# Patient Record
Sex: FEMALE
[De-identification: names, ages, dates, MRNs, and addresses within clinical notes are randomized per-mention and may not be internally consistent; named-entity substitution may affect disease eponyms.]

---

## 2020-05-18 ENCOUNTER — APPOINTMENT (OUTPATIENT)
Dept: GASTROENTEROLOGY | Facility: CLINIC | Age: 55
End: 2020-05-18
Payer: MEDICAID

## 2020-05-18 PROBLEM — Z00.00 ENCOUNTER FOR PREVENTIVE HEALTH EXAMINATION: Status: ACTIVE | Noted: 2020-05-18

## 2020-05-21 ENCOUNTER — APPOINTMENT (OUTPATIENT)
Dept: GASTROENTEROLOGY | Facility: CLINIC | Age: 55
End: 2020-05-21
Payer: MEDICAID

## 2020-05-21 VITALS — TEMPERATURE: 98.3 F

## 2020-05-21 VITALS
WEIGHT: 164 LBS | HEIGHT: 61 IN | DIASTOLIC BLOOD PRESSURE: 70 MMHG | HEART RATE: 78 BPM | SYSTOLIC BLOOD PRESSURE: 106 MMHG | BODY MASS INDEX: 30.96 KG/M2 | RESPIRATION RATE: 15 BRPM | OXYGEN SATURATION: 97 %

## 2020-05-21 DIAGNOSIS — Z12.11 ENCOUNTER FOR SCREENING FOR MALIGNANT NEOPLASM OF COLON: ICD-10-CM

## 2020-05-21 DIAGNOSIS — K21.9 GASTRO-ESOPHAGEAL REFLUX DISEASE W/OUT ESOPHAGITIS: ICD-10-CM

## 2020-05-21 PROCEDURE — 82272 OCCULT BLD FECES 1-3 TESTS: CPT

## 2020-05-21 PROCEDURE — 99204 OFFICE O/P NEW MOD 45 MIN: CPT

## 2020-05-21 RX ORDER — OMEPRAZOLE 40 MG/1
40 CAPSULE, DELAYED RELEASE ORAL
Refills: 0 | Status: ACTIVE | COMMUNITY

## 2020-05-21 RX ORDER — OMEPRAZOLE 40 MG/1
40 CAPSULE, DELAYED RELEASE ORAL DAILY
Qty: 90 | Refills: 1 | Status: ACTIVE | COMMUNITY
Start: 2020-05-21 | End: 1900-01-01

## 2020-05-21 RX ORDER — CLOTRIMAZOLE AND BETAMETHASONE DIPROPIONATE 10; .5 MG/G; MG/G
1-0.05 CREAM TOPICAL
Qty: 15 | Refills: 0 | Status: ACTIVE | COMMUNITY
Start: 2020-03-27

## 2020-05-21 NOTE — HISTORY OF PRESENT ILLNESS
[de-identified] : The patient is here for evaluation of GERD and screening colonoscopy.\par    Patient is a history of GERD for the last 4 months. She has heartburn that occurs every day. It is severe and lasts for hours. It is worse after eating and she only limits result of a  at this point. She gets a chest pressure and sometimes regurgitation. Physical globus sensation. As well as a sore throat. No weight loss. She tried Prilosec 20 mg a day with no improvement of symptoms. She tried TUMS which gave her nausea.\par     The patient has no family history of colon cancer or polyps in a first-degree relative. She's never had a colonoscopy. She has no constipation diarrhea black stools or bloody stools

## 2020-05-21 NOTE — ASSESSMENT
[FreeTextEntry1] : A/P \par will do EGD and colon same day\par gerd\par Today's instructions for acid reflux include avoid provocative foods. For example citrus alcohol coffee chocolate mints. Smaller meals, no eating 3 hours prior to bedtime and elevate head of the bed prior to sleep.\par increase to prilosec 40 mg qd\par  I discussed the risks and benefits of EGD and patient was given opportunity to ask questions. EGD to r/o Meier's  esophagus, PUD, mass, AVM'S. Pt is medically optimized for EGD\par \par \par screening colon\par miralax prep\par  I discussed the risks and benefits of colonoscopy and patient was given opportunity to ask questions. Colonoscopy to r/o colon cancer, polyps, AVM's. Patient is medically optimized for the procedure\par cbc, cmp requested from \par  Due to the  current COVID - 19 pandemic , [place] state has recommended that all elective endoscopic procedures be deferred.   Pt will be rescheduled for procedures when the social distancing recommendation is lifted. Pt will be instructed by staff to call if  any worrisome symptoms arise such as rectal bleeding, change in bowel habits occur.\par \par \par

## 2020-05-21 NOTE — PHYSICAL EXAM
[General Appearance - Alert] : alert [General Appearance - In No Acute Distress] : in no acute distress [General Appearance - Well Nourished] : well nourished [Sclera] : the sclera and conjunctiva were normal [Outer Ear] : the ears and nose were normal in appearance [] : no respiratory distress [Neck Appearance] : the appearance of the neck was normal [Respiration, Rhythm And Depth] : normal respiratory rhythm and effort [Exaggerated Use Of Accessory Muscles For Inspiration] : no accessory muscle use [Auscultation Breath Sounds / Voice Sounds] : lungs were clear to auscultation bilaterally [Apical Impulse] : the apical impulse was normal [Heart Rate And Rhythm] : heart rate was normal and rhythm regular [Heart Sounds] : normal S1 and S2 [Abdomen Soft] : soft [Bowel Sounds] : normal bowel sounds [Abdomen Tenderness] : non-tender [Oriented To Time, Place, And Person] : oriented to person, place, and time [Impaired Insight] : insight and judgment were intact [Affect] : the affect was normal [Normal Sphincter Tone] : normal sphincter tone [No Rectal Mass] : no rectal mass [Internal Hemorrhoid] : no internal hemorrhoids [External Hemorrhoid] : no external hemorrhoids [Occult Blood Positive] : stool was negative for occult blood

## 2020-06-03 ENCOUNTER — APPOINTMENT (OUTPATIENT)
Dept: ORTHOPEDIC SURGERY | Facility: CLINIC | Age: 55
End: 2020-06-03
Payer: MEDICAID

## 2020-06-03 VITALS
BODY MASS INDEX: 30.21 KG/M2 | HEIGHT: 61 IN | WEIGHT: 160 LBS | DIASTOLIC BLOOD PRESSURE: 69 MMHG | HEART RATE: 65 BPM | SYSTOLIC BLOOD PRESSURE: 101 MMHG

## 2020-06-03 VITALS — TEMPERATURE: 97.7 F

## 2020-06-03 DIAGNOSIS — Z78.9 OTHER SPECIFIED HEALTH STATUS: ICD-10-CM

## 2020-06-03 PROCEDURE — 99203 OFFICE O/P NEW LOW 30 MIN: CPT

## 2020-06-03 PROCEDURE — 73110 X-RAY EXAM OF WRIST: CPT | Mod: LT

## 2020-06-03 PROCEDURE — 99204 OFFICE O/P NEW MOD 45 MIN: CPT

## 2020-06-03 RX ORDER — CREAM BASE NO.31
CREAM (GRAM) MISCELLANEOUS
Qty: 1 | Refills: 0 | Status: ACTIVE | COMMUNITY
Start: 2020-06-03 | End: 1900-01-01

## 2020-06-03 RX ORDER — METHYLPREDNISOLONE 4 MG/1
4 TABLET ORAL
Qty: 1 | Refills: 0 | Status: ACTIVE | COMMUNITY
Start: 2020-06-03 | End: 1900-01-01

## 2020-06-11 DIAGNOSIS — Z01.818 ENCOUNTER FOR OTHER PREPROCEDURAL EXAMINATION: ICD-10-CM

## 2020-06-15 ENCOUNTER — APPOINTMENT (OUTPATIENT)
Dept: DISASTER EMERGENCY | Facility: CLINIC | Age: 55
End: 2020-06-15

## 2020-06-16 LAB — SARS-COV-2 N GENE NPH QL NAA+PROBE: NOT DETECTED

## 2020-06-18 ENCOUNTER — APPOINTMENT (OUTPATIENT)
Dept: GASTROENTEROLOGY | Facility: GI CENTER | Age: 55
End: 2020-06-18
Payer: MEDICAID

## 2020-06-18 ENCOUNTER — RESULT REVIEW (OUTPATIENT)
Age: 55
End: 2020-06-18

## 2020-06-18 ENCOUNTER — OUTPATIENT (OUTPATIENT)
Dept: OUTPATIENT SERVICES | Facility: HOSPITAL | Age: 55
LOS: 1 days | End: 2020-06-18
Payer: COMMERCIAL

## 2020-06-18 DIAGNOSIS — K21.9 GASTRO-ESOPHAGEAL REFLUX DISEASE WITHOUT ESOPHAGITIS: ICD-10-CM

## 2020-06-18 DIAGNOSIS — Z12.11 ENCOUNTER FOR SCREENING FOR MALIGNANT NEOPLASM OF COLON: ICD-10-CM

## 2020-06-18 PROCEDURE — 88342 IMHCHEM/IMCYTCHM 1ST ANTB: CPT | Mod: 26

## 2020-06-18 PROCEDURE — 88305 TISSUE EXAM BY PATHOLOGIST: CPT

## 2020-06-18 PROCEDURE — 45378 DIAGNOSTIC COLONOSCOPY: CPT

## 2020-06-18 PROCEDURE — 88342 IMHCHEM/IMCYTCHM 1ST ANTB: CPT

## 2020-06-18 PROCEDURE — 43239 EGD BIOPSY SINGLE/MULTIPLE: CPT | Mod: 59

## 2020-06-18 PROCEDURE — 43239 EGD BIOPSY SINGLE/MULTIPLE: CPT

## 2020-06-18 PROCEDURE — G0121: CPT

## 2020-06-18 PROCEDURE — 88305 TISSUE EXAM BY PATHOLOGIST: CPT | Mod: 26

## 2020-06-18 RX ORDER — SUCRALFATE 1 G/1
1 TABLET ORAL 4 TIMES DAILY
Qty: 120 | Refills: 3 | Status: ACTIVE | COMMUNITY
Start: 2020-06-18 | End: 1900-01-01

## 2020-06-18 NOTE — REVIEW OF SYSTEMS
Quality 110: Preventive Care And Screening: Influenza Immunization: Influenza Immunization not Administered because Patient Refused. Quality 130: Documentation Of Current Medications In The Medical Record: Current Medications Documented Quality 474: Zoster Vaccination Status: Shingrix Vaccination not Administered or Previously Received, Reason not Otherwise Specified Detail Level: Detailed [Heartburn] : heartburn [Negative] : Heme/Lymph

## 2020-06-18 NOTE — REASON FOR VISIT
[Follow-Up: _____] : a [unfilled] follow-up visit [FreeTextEntry1] : gerd, screening colon [Endoscopy] : an endoscopy [FreeTextEntry2] : gerd, screening colon

## 2020-06-18 NOTE — ASSESSMENT
[FreeTextEntry1] : A/P\par  Hiatal hernia- continue PPI ( prilosec 40 mg qd)\par hemorrhoids- no polyps\par colonoscopy in 10 years\par F/U in office in 3 months

## 2020-06-18 NOTE — PROCEDURE
[Colon Cancer Screening] : colon cancer screening [Bleeding] : bleeding risk [Infection] : risk of infection [Bowel Prep Kit] : the patient took the appropriate bowel preparation kit as directed [Approved Diet Followed] : the patient avoided solid foods and adhered to the approved diet list for 24 hours prior to the procedure [Prep Qualtiy: ___] : Prep Quality:  [unfilled] [Withdrawal Time: ___] : Withdrawal Time:  [unfilled] [Left Lateral Decubitus] : The patient was positioned in the left lateral decubitus position [Cecum (Landmarks/Transillum)] : and guided to the cecum which was identified by the anatomic landmarks of the appendiceal orifice and ileocecal valve and by transillumination in the right lower quadrant [Insufflated] : insufflated [Retroflex View] : a retroflex view of the rectum was performed [Single Pass Needed] : after a single pass [Hemorrhoids] : hemorrhoids [No Complications] : There were no complications [With Biopsy] : with biopsy [Procedure Explained] : The procedure was explained [GERD] : GERD [Allergies Reviewed] : allergies reviewed. [Benefits] : benefits [Risks] : Risks [Alternatives] : alternatives [Consent Obtained] : written consent was obtained prior to the procedure and is detailed in the patient's record [Patient] : the patient [Cardiac Monitor] : cardiac monitor [Automated Blood Pressure Cuff] : automated blood pressure cuff [Pulse Oximeter] : pulse oximeter [2] : 2 [Sedation Clearance] : the patient was cleared for moderate sedation [Performed By: ___] : Performed by:  SHAHEEN [Hiatal Hernia] : hiatal hernia [Normal] : Normal [Sent to Pathology] : was sent to pathology for analysis [Tolerated Well] : the patient tolerated the procedure well [Vital Signs Stable] : the vital signs were stable [Abnormal Rectum] : a normal rectum [External Hemorrhoids] : no external hemorrhoids [Patient Rotated Into Alternating Positions] : the patient was not rotated [de-identified] : 2 cm hiatal hernia [de-identified] : Random biopsies taken from antrum  and body r/o HP

## 2020-06-18 NOTE — PHYSICAL EXAM
[General Appearance - Alert] : alert [General Appearance - In No Acute Distress] : in no acute distress [General Appearance - Well Nourished] : well nourished [General Appearance - Well Developed] : well developed [Sclera] : the sclera and conjunctiva were normal [Outer Ear] : the ears and nose were normal in appearance [Neck Appearance] : the appearance of the neck was normal [] : no respiratory distress [Exaggerated Use Of Accessory Muscles For Inspiration] : no accessory muscle use [Respiration, Rhythm And Depth] : normal respiratory rhythm and effort [Apical Impulse] : the apical impulse was normal [Auscultation Breath Sounds / Voice Sounds] : lungs were clear to auscultation bilaterally [Heart Sounds] : normal S1 and S2 [Heart Rate And Rhythm] : heart rate was normal and rhythm regular [Bowel Sounds] : normal bowel sounds [Abdomen Tenderness] : non-tender [Abdomen Soft] : soft [Oriented To Time, Place, And Person] : oriented to person, place, and time [Impaired Insight] : insight and judgment were intact [Affect] : the affect was normal

## 2020-06-18 NOTE — PROCEDURE
[Colon Cancer Screening] : colon cancer screening [Bleeding] : bleeding risk [Infection] : risk of infection [Bowel Prep Kit] : the patient took the appropriate bowel preparation kit as directed [Approved Diet Followed] : the patient avoided solid foods and adhered to the approved diet list for 24 hours prior to the procedure [Prep Qualtiy: ___] : Prep Quality:  [unfilled] [Withdrawal Time: ___] : Withdrawal Time:  [unfilled] [Left Lateral Decubitus] : The patient was positioned in the left lateral decubitus position [Cecum (Landmarks/Transillum)] : and guided to the cecum which was identified by the anatomic landmarks of the appendiceal orifice and ileocecal valve and by transillumination in the right lower quadrant [Insufflated] : insufflated [Single Pass Needed] : after a single pass [Retroflex View] : a retroflex view of the rectum was performed [Hemorrhoids] : hemorrhoids [No Complications] : There were no complications [With Biopsy] : with biopsy [GERD] : GERD [Procedure Explained] : The procedure was explained [Allergies Reviewed] : allergies reviewed. [Risks] : Risks [Benefits] : benefits [Alternatives] : alternatives [Consent Obtained] : written consent was obtained prior to the procedure and is detailed in the patient's record [Patient] : the patient [Automated Blood Pressure Cuff] : automated blood pressure cuff [Cardiac Monitor] : cardiac monitor [Pulse Oximeter] : pulse oximeter [2] : 2 [Sedation Clearance] : the patient was cleared for moderate sedation [Performed By: ___] : Performed by:  SHAHEEN [Hiatal Hernia] : hiatal hernia [Normal] : Normal [Sent to Pathology] : was sent to pathology for analysis [Tolerated Well] : the patient tolerated the procedure well [Vital Signs Stable] : the vital signs were stable [Abnormal Rectum] : a normal rectum [External Hemorrhoids] : no external hemorrhoids [Patient Rotated Into Alternating Positions] : the patient was not rotated [de-identified] : 2 cm hiatal hernia [de-identified] : Random biopsies taken from antrum  and body r/o HP

## 2020-06-22 LAB — SURGICAL PATHOLOGY STUDY: SIGNIFICANT CHANGE UP

## 2020-07-01 ENCOUNTER — APPOINTMENT (OUTPATIENT)
Dept: PHYSICAL MEDICINE AND REHAB | Facility: CLINIC | Age: 55
End: 2020-07-01
Payer: MEDICAID

## 2020-07-01 VITALS
DIASTOLIC BLOOD PRESSURE: 68 MMHG | SYSTOLIC BLOOD PRESSURE: 105 MMHG | TEMPERATURE: 97.5 F | HEIGHT: 61 IN | WEIGHT: 164 LBS | HEART RATE: 73 BPM | BODY MASS INDEX: 30.96 KG/M2

## 2020-07-01 PROCEDURE — 76882 US LMTD JT/FCL EVL NVASC XTR: CPT | Mod: LT

## 2020-07-01 PROCEDURE — 95908 NRV CNDJ TST 3-4 STUDIES: CPT | Mod: LT

## 2020-07-14 ENCOUNTER — APPOINTMENT (OUTPATIENT)
Dept: ORTHOPEDIC SURGERY | Facility: CLINIC | Age: 55
End: 2020-07-14
Payer: MEDICAID

## 2020-07-14 VITALS
DIASTOLIC BLOOD PRESSURE: 66 MMHG | HEIGHT: 61 IN | BODY MASS INDEX: 30.96 KG/M2 | WEIGHT: 164 LBS | HEART RATE: 71 BPM | SYSTOLIC BLOOD PRESSURE: 111 MMHG

## 2020-07-14 DIAGNOSIS — R20.2 PARESTHESIA OF SKIN: ICD-10-CM

## 2020-07-14 PROCEDURE — 99214 OFFICE O/P EST MOD 30 MIN: CPT

## 2020-07-14 RX ORDER — METHYLPREDNISOLONE 4 MG/1
4 TABLET ORAL
Qty: 1 | Refills: 0 | Status: ACTIVE | COMMUNITY
Start: 2020-07-14 | End: 1900-01-01

## 2020-12-23 PROBLEM — Z12.11 ENCOUNTER FOR SCREENING COLONOSCOPY: Status: RESOLVED | Noted: 2020-05-21 | Resolved: 2020-12-23

## 2021-04-23 ENCOUNTER — TRANSCRIPTION ENCOUNTER (OUTPATIENT)
Age: 56
End: 2021-04-23

## 2021-08-08 ENCOUNTER — NON-APPOINTMENT (OUTPATIENT)
Age: 56
End: 2021-08-08

## 2021-08-09 ENCOUNTER — APPOINTMENT (OUTPATIENT)
Dept: ORTHOPEDIC SURGERY | Facility: CLINIC | Age: 56
End: 2021-08-09
Payer: MEDICAID

## 2021-08-09 VITALS
DIASTOLIC BLOOD PRESSURE: 75 MMHG | HEART RATE: 76 BPM | BODY MASS INDEX: 30.96 KG/M2 | WEIGHT: 164 LBS | SYSTOLIC BLOOD PRESSURE: 108 MMHG | HEIGHT: 61 IN

## 2021-08-09 DIAGNOSIS — M18.11 UNILATERAL PRIMARY OSTEOARTHRITIS OF FIRST CARPOMETACARPAL JOINT, RIGHT HAND: ICD-10-CM

## 2021-08-09 PROCEDURE — 20600 DRAIN/INJ JOINT/BURSA W/O US: CPT | Mod: FA

## 2021-08-09 PROCEDURE — 73130 X-RAY EXAM OF HAND: CPT | Mod: LT

## 2021-08-09 PROCEDURE — 99214 OFFICE O/P EST MOD 30 MIN: CPT | Mod: 25

## 2021-08-09 RX ORDER — METHYLPREDNISOLONE ACETATE 40 MG/ML
40 INJECTION, SUSPENSION INTRA-ARTICULAR; INTRALESIONAL; INTRAMUSCULAR; SOFT TISSUE
Qty: 0.5 | Refills: 0 | Status: ACTIVE | COMMUNITY
Start: 2021-08-09

## 2022-06-21 ENCOUNTER — APPOINTMENT (OUTPATIENT)
Dept: ORTHOPEDIC SURGERY | Facility: CLINIC | Age: 57
End: 2022-06-21
Payer: MEDICAID

## 2022-06-21 VITALS
HEIGHT: 61 IN | HEART RATE: 75 BPM | SYSTOLIC BLOOD PRESSURE: 106 MMHG | WEIGHT: 164 LBS | DIASTOLIC BLOOD PRESSURE: 71 MMHG | BODY MASS INDEX: 30.96 KG/M2

## 2022-06-21 DIAGNOSIS — M18.12 UNILATERAL PRIMARY OSTEOARTHRITIS OF FIRST CARPOMETACARPAL JOINT, LEFT HAND: ICD-10-CM

## 2022-06-21 PROCEDURE — 20600 DRAIN/INJ JOINT/BURSA W/O US: CPT | Mod: FA

## 2022-06-21 PROCEDURE — 99214 OFFICE O/P EST MOD 30 MIN: CPT | Mod: 25

## 2022-06-21 RX ORDER — METHYLPREDNISOLONE ACETATE 40 MG/ML
40 INJECTION, SUSPENSION INTRA-ARTICULAR; INTRALESIONAL; INTRAMUSCULAR; SOFT TISSUE
Qty: 0.5 | Refills: 0 | Status: ACTIVE | COMMUNITY
Start: 2022-06-21

## 2022-11-01 ENCOUNTER — OFFICE VISIT (OUTPATIENT)
Dept: FAMILY MEDICINE CLINIC | Facility: CLINIC | Age: 57
End: 2022-11-01

## 2022-11-01 VITALS
SYSTOLIC BLOOD PRESSURE: 112 MMHG | HEIGHT: 61 IN | BODY MASS INDEX: 27.75 KG/M2 | DIASTOLIC BLOOD PRESSURE: 76 MMHG | TEMPERATURE: 97.8 F | WEIGHT: 147 LBS | OXYGEN SATURATION: 98 % | HEART RATE: 76 BPM

## 2022-11-01 DIAGNOSIS — Z00.00 HEALTHCARE MAINTENANCE: ICD-10-CM

## 2022-11-01 DIAGNOSIS — Z12.31 SCREENING MAMMOGRAM, ENCOUNTER FOR: ICD-10-CM

## 2022-11-01 DIAGNOSIS — Z00.00 ANNUAL PHYSICAL EXAM: Primary | ICD-10-CM

## 2022-11-01 DIAGNOSIS — Z23 NEED FOR TUBERCULOSIS VACCINATION: ICD-10-CM

## 2022-11-01 DIAGNOSIS — N28.9 KIDNEY DISEASE: ICD-10-CM

## 2022-11-01 NOTE — PROGRESS NOTES
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  [address]    NAME: Laurie Núñez  AGE: 64 y o  SEX: female  : 1965     DATE: 2022     Assessment and Plan:     Problem List Items Addressed This Visit        Genitourinary    Kidney disease     Will scan records into chart  Bifurcated kidney  Referred to nephrology to follow  Relevant Orders    Ambulatory Referral to Nephrology      Other Visit Diagnoses     Annual physical exam    -  Primary    Screening mammogram, encounter for        Relevant Orders    Mammo screening bilateral w 3d & cad    BMI 27 0-27 9,adult        Healthcare maintenance        Relevant Orders    CBC and differential    Comprehensive metabolic panel    Lipid panel    TSH, 3rd generation with Free T4 reflex    Need for tuberculosis vaccination        Relevant Orders    TB Skin Test (Completed)          Immunizations and preventive care screenings were discussed with patient today  Appropriate education was printed on patient's after visit summary  Counseling:  · Exercise: the importance of regular exercise/physical activity was discussed  Recommend exercise 3-5 times per week for at least 30 minutes  BMI Counseling: Body mass index is 27 78 kg/m²  The BMI is above normal  Nutrition recommendations include decreasing portion sizes and encouraging healthy choices of fruits and vegetables  Exercise recommendations include moderate physical activity 150 minutes/week and exercising 3-5 times per week   Rationale for BMI follow-up plan is due to patient being overweight or obese  Depression Screening and Follow-up Plan: Patient was screened for depression during today's encounter  They screened negative with a PHQ-2 score of 0  Return in about 6 months (around 5/1/2023)  Chief Complaint:     Chief Complaint   Patient presents with   • Establish Care      History of Present Illness:     Adult Annual Physical   Patient here for a comprehensive physical exam  The patient reports no problems  Diet and Physical Activity  · Diet/Nutrition: well balanced diet  · Exercise: no formal exercise  Depression Screening  PHQ-2/9 Depression Screening    Little interest or pleasure in doing things: 0 - not at all  Feeling down, depressed, or hopeless: 0 - not at all  PHQ-2 Score: 0  PHQ-2 Interpretation: Negative depression screen       General Health  · Sleep: sleeps well  · Hearing: hearing aid right ear   · Vision: goes for regular eye exams and wears glasses  · Dental: regular dental visits  /GYN Health  · Patient is: postmenopausal  · Last menstrual period:   · Contraceptive method:       Review of Systems:     Review of Systems   Constitutional: Negative for chills and fever  HENT: Negative for ear pain and sore throat  Eyes: Negative for pain and visual disturbance  Respiratory: Negative for cough and shortness of breath  Cardiovascular: Negative for chest pain and palpitations  Gastrointestinal: Negative for abdominal pain and vomiting  Genitourinary: Negative for dysuria and hematuria  Musculoskeletal: Negative for arthralgias and back pain  Skin: Negative for color change and rash  Neurological: Negative for seizures and syncope  Psychiatric/Behavioral: Negative for dysphoric mood and sleep disturbance  The patient is not nervous/anxious  All other systems reviewed and are negative  Past Medical History:     History reviewed  No pertinent past medical history     Past Surgical History:     Past Surgical History: Procedure Laterality Date   • PARTIAL HYSTERECTOMY     • THYROIDECTOMY        Social History:     Social History     Socioeconomic History   • Marital status:      Spouse name: None   • Number of children: None   • Years of education: None   • Highest education level: None   Occupational History   • None   Tobacco Use   • Smoking status: Never Smoker   • Smokeless tobacco: Never Used   Substance and Sexual Activity   • Alcohol use: Not Currently   • Drug use: Never   • Sexual activity: None   Other Topics Concern   • None   Social History Narrative   • None     Social Determinants of Health     Financial Resource Strain: Not on file   Food Insecurity: Not on file   Transportation Needs: Not on file   Physical Activity: Not on file   Stress: Not on file   Social Connections: Not on file   Intimate Partner Violence: Not on file   Housing Stability: Not on file      Family History:     Family History   Problem Relation Age of Onset   • No Known Problems Mother    • Liver cancer Father       Current Medications:     No current outpatient medications on file  No current facility-administered medications for this visit  Allergies: Allergies   Allergen Reactions   • Sulfur Rash      Physical Exam:     /76   Pulse 76   Temp 97 8 °F (36 6 °C)   Ht 5' 1" (1 549 m)   Wt 66 7 kg (147 lb)   SpO2 98%   BMI 27 78 kg/m²     Physical Exam  Vitals and nursing note reviewed  Constitutional:       General: She is not in acute distress  Appearance: She is well-developed  HENT:      Head: Normocephalic and atraumatic  Right Ear: Tympanic membrane normal       Left Ear: Tympanic membrane normal    Eyes:      Conjunctiva/sclera: Conjunctivae normal    Cardiovascular:      Rate and Rhythm: Normal rate and regular rhythm  Heart sounds: No murmur heard  Pulmonary:      Effort: Pulmonary effort is normal  No respiratory distress  Breath sounds: Normal breath sounds     Abdominal: Palpations: Abdomen is soft  Tenderness: There is no abdominal tenderness  Musculoskeletal:      Cervical back: Neck supple  Skin:     General: Skin is warm and dry  Neurological:      Mental Status: She is alert and oriented to person, place, and time     Psychiatric:         Mood and Affect: Mood normal           Deborah Castro, [address] [facility]

## 2022-11-01 NOTE — PATIENT INSTRUCTIONS

## 2022-11-03 ENCOUNTER — CLINICAL SUPPORT (OUTPATIENT)
Dept: FAMILY MEDICINE CLINIC | Facility: CLINIC | Age: 57
End: 2022-11-03

## 2022-11-03 DIAGNOSIS — Z11.1 ENCOUNTER FOR PPD SKIN TEST READING: Primary | ICD-10-CM

## 2022-11-03 LAB
INDURATION: NORMAL MM
TB SKIN TEST: NEGATIVE

## 2022-11-09 ENCOUNTER — TELEPHONE (OUTPATIENT)
Dept: NEPHROLOGY | Facility: CLINIC | Age: 57
End: 2022-11-09

## 2022-11-09 NOTE — TELEPHONE ENCOUNTER
I called and left messag on patients answering machine for patient to return our call back about scheduling a Nephrology Consult Ref by Dr Luis Newton for Kidney disease

## 2022-11-18 ENCOUNTER — TELEPHONE (OUTPATIENT)
Dept: NEPHROLOGY | Facility: CLINIC | Age: 57
End: 2022-11-18

## 2022-11-18 NOTE — TELEPHONE ENCOUNTER
I called and left message on patients answering machine for patient to return our call back about scheduling a Nephrology Consult Ref by Dr Sia Smith for Kidney disease  Unable to contact patient 3x  Letter sent to patients address   Referral closed

## 2022-12-13 ENCOUNTER — TELEPHONE (OUTPATIENT)
Dept: FAMILY MEDICINE CLINIC | Facility: CLINIC | Age: 57
End: 2022-12-13

## 2022-12-13 NOTE — TELEPHONE ENCOUNTER
Opal Pastrana called and said last time she saw Jamar Chan that she said if she ever needed a name or referral for bereavement whether its a group or person to call, well she would like a name for either she does state that she does not have insurance and only works part time job

## 2023-01-17 ENCOUNTER — TELEPHONE (OUTPATIENT)
Dept: FAMILY MEDICINE CLINIC | Facility: CLINIC | Age: 58
End: 2023-01-17

## 2023-01-17 NOTE — TELEPHONE ENCOUNTER
Pt requested information on whether [facility] offers discounts for widows that are self pay due to losing insurance from spouse who passed away  As per Bill Chaudhary, she can call [facility] and ask to speak with a  and explain the situation to them so they can assist      Left voicemail for patient to give her this information

## 2023-03-09 DIAGNOSIS — Z12.31 SCREENING MAMMOGRAM, ENCOUNTER FOR: ICD-10-CM

## 2023-05-01 ENCOUNTER — OFFICE VISIT (OUTPATIENT)
Dept: FAMILY MEDICINE CLINIC | Facility: CLINIC | Age: 58
End: 2023-05-01

## 2023-05-01 VITALS
WEIGHT: 153.2 LBS | OXYGEN SATURATION: 97 % | BODY MASS INDEX: 28.92 KG/M2 | TEMPERATURE: 97 F | DIASTOLIC BLOOD PRESSURE: 80 MMHG | SYSTOLIC BLOOD PRESSURE: 98 MMHG | HEIGHT: 61 IN | HEART RATE: 77 BPM

## 2023-05-01 DIAGNOSIS — N28.9 KIDNEY DISEASE: ICD-10-CM

## 2023-05-01 DIAGNOSIS — Z00.00 HEALTHCARE MAINTENANCE: Primary | ICD-10-CM

## 2023-05-01 NOTE — PROGRESS NOTES
"   Assessment/Plan:     Chronic Problems:  Kidney disease  Reviewed labs with patient, all wnl  Will hold off on Nephrology consult due to no insurance  Visit Diagnosis:  Diagnoses and all orders for this visit:    Healthcare maintenance  -     CBC and differential; Future  -     Comprehensive metabolic panel; Future  -     Lipid panel; Future  -     TSH, 3rd generation with Free T4 reflex; Future    Kidney disease          Subjective:    Patient ID: Robi Conteh is a 62 y o  female  Patient presents for routine follow up  She did have blood work done at [facility], all wnl  She is feeling well and has no concerns for today  The following portions of the patient's history were reviewed and updated as appropriate: allergies, current medications, past family history, past medical history, past social history, past surgical history and problem list     Review of Systems   Constitutional: Negative for chills and fever  HENT: Negative for ear pain and sore throat  Eyes: Negative for pain and visual disturbance  Respiratory: Negative for cough and shortness of breath  Cardiovascular: Negative for chest pain and palpitations  Gastrointestinal: Negative for abdominal pain and vomiting  Genitourinary: Negative for dysuria and hematuria  Musculoskeletal: Negative for arthralgias and back pain  Skin: Negative for color change and rash  Neurological: Negative for dizziness, seizures, syncope, light-headedness and headaches  Psychiatric/Behavioral: Positive for dysphoric mood and sleep disturbance  The patient is nervous/anxious  All other systems reviewed and are negative  BP 98/80 (BP Location: Left arm, Patient Position: Sitting, Cuff Size: Standard)   Pulse 77   Temp (!) 97 °F (36 1 °C)   Ht 5' 1\" (1 549 m)   Wt 69 5 kg (153 lb 3 2 oz)   SpO2 97%   BMI 28 95 kg/m²   Social History     Socioeconomic History    Marital status:       Spouse name: Not on file    Number of " children: Not on file    Years of education: Not on file    Highest education level: Not on file   Occupational History    Not on file   Tobacco Use    Smoking status: Never    Smokeless tobacco: Never   Substance and Sexual Activity    Alcohol use: Not Currently    Drug use: Never    Sexual activity: Not on file   Other Topics Concern    Not on file   Social History Narrative    Not on file     Social Determinants of Health     Financial Resource Strain: Not on file   Food Insecurity: Not on file   Transportation Needs: Not on file   Physical Activity: Not on file   Stress: Not on file   Social Connections: Not on file   Intimate Partner Violence: Not on file   Housing Stability: Not on file     History reviewed  No pertinent past medical history  Family History   Problem Relation Age of Onset    No Known Problems Mother     Liver cancer Father      Past Surgical History:   Procedure Laterality Date    PARTIAL HYSTERECTOMY      THYROIDECTOMY       No current outpatient medications on file  Allergies   Allergen Reactions    Sulfur Rash          Lab Review   No visits with results within 2 Month(s) from this visit  Latest known visit with results is:   Office Visit on 11/01/2022   Component Date Value    TB Skin Test 11/03/2022 Negative     Induration 11/03/2022 0n         Imaging: No results found  Objective:     Physical Exam  Constitutional:       Appearance: She is well-developed  Cardiovascular:      Rate and Rhythm: Normal rate and regular rhythm  Heart sounds: Normal heart sounds  No murmur heard  Pulmonary:      Effort: Pulmonary effort is normal  No respiratory distress  Breath sounds: Normal breath sounds  Skin:     General: Skin is warm and dry  Neurological:      Mental Status: She is alert and oriented to person, place, and time  There are no Patient Instructions on file for this visit      VIKTROIA Willis    Portions of the record may have been "created with voice recognition software  Occasional wrong word or \"sound a like\" substitutions may have occurred due to the inherent limitations of voice recognition software  Read the chart carefully and recognize, using context, where substitutions have occurred    "

## 2023-12-10 ENCOUNTER — TELEPHONE (OUTPATIENT)
Dept: OTHER | Facility: OTHER | Age: 58
End: 2023-12-10

## 2023-12-11 ENCOUNTER — TELEPHONE (OUTPATIENT)
Dept: FAMILY MEDICINE CLINIC | Facility: CLINIC | Age: 58
End: 2023-12-11

## 2023-12-11 NOTE — TELEPHONE ENCOUNTER
Pt called regarding a follow up appt from the hospital that needed to be scheduled. Pt would prefer to be called before 8:15AM or after 4PM to schedule appt.

## 2023-12-11 NOTE — TELEPHONE ENCOUNTER
Spoke with patient- she stated that will follow up with her neurologist and cardiologist and get back in touch with us afterwards to follow up with David He unless a 7am slot opens up for her to take. Thank you!

## 2024-01-04 ENCOUNTER — OFFICE VISIT (OUTPATIENT)
Dept: FAMILY MEDICINE CLINIC | Facility: CLINIC | Age: 59
End: 2024-01-04
Payer: COMMERCIAL

## 2024-01-04 VITALS
OXYGEN SATURATION: 97 % | HEIGHT: 61 IN | HEART RATE: 98 BPM | SYSTOLIC BLOOD PRESSURE: 90 MMHG | WEIGHT: 154.4 LBS | BODY MASS INDEX: 29.15 KG/M2 | DIASTOLIC BLOOD PRESSURE: 60 MMHG

## 2024-01-04 DIAGNOSIS — R42 DIZZINESS: ICD-10-CM

## 2024-01-04 DIAGNOSIS — Z12.31 ENCOUNTER FOR SCREENING MAMMOGRAM FOR MALIGNANT NEOPLASM OF BREAST: ICD-10-CM

## 2024-01-04 DIAGNOSIS — S06.0X1D CONCUSSION WITH LOSS OF CONSCIOUSNESS OF 30 MINUTES OR LESS, SUBSEQUENT ENCOUNTER: Primary | ICD-10-CM

## 2024-01-04 PROCEDURE — 99214 OFFICE O/P EST MOD 30 MIN: CPT | Performed by: NURSE PRACTITIONER

## 2024-01-04 RX ORDER — NAPROXEN 500 MG/1
1 TABLET ORAL 2 TIMES DAILY
COMMUNITY
Start: 2023-12-08

## 2024-01-04 RX ORDER — CYCLOBENZAPRINE HCL 10 MG
TABLET ORAL
COMMUNITY
Start: 2023-12-07

## 2024-01-04 RX ORDER — IBUPROFEN 800 MG/1
TABLET ORAL
COMMUNITY
Start: 2023-11-20
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
## 2024-03-06 DIAGNOSIS — Z12.31 ENCOUNTER FOR SCREENING MAMMOGRAM FOR MALIGNANT NEOPLASM OF BREAST: ICD-10-CM

## 2024-03-11 ENCOUNTER — TELEPHONE (OUTPATIENT)
Dept: FAMILY MEDICINE CLINIC | Facility: CLINIC | Age: 59
End: 2024-03-11

## 2024-03-11 DIAGNOSIS — N28.9 KIDNEY DISEASE: Primary | ICD-10-CM

## 2024-03-11 NOTE — TELEPHONE ENCOUNTER
She had surgery for kidney stones almost 2 years ago as well as thyroid surgery. She would like to establish with both specialties. Also orthopedics for arthritis. Please adivse.

## 2024-03-13 ENCOUNTER — TELEPHONE (OUTPATIENT)
Dept: NEPHROLOGY | Facility: CLINIC | Age: 59
End: 2024-03-13

## 2024-03-13 NOTE — TELEPHONE ENCOUNTER
I called and left a message on machine for patient to return our call about scheduling a Nephrology Consult appointment ref by Ale Castro for Kidney disease.

## 2024-03-14 ENCOUNTER — TELEPHONE (OUTPATIENT)
Dept: NEPHROLOGY | Facility: CLINIC | Age: 59
End: 2024-03-14

## 2024-03-14 NOTE — TELEPHONE ENCOUNTER
I called and left a message on machine for patient to return our call about scheduling a Nephrology Consult appointment ref by VIKTORIA Willis for Kidney disease. I also explained that I was mailing out a letter to the patient just as a reminder to recall our call so we can schedule her Nephrology Consult appointment.

## 2024-04-03 ENCOUNTER — TELEPHONE (OUTPATIENT)
Dept: NEPHROLOGY | Facility: CLINIC | Age: 59
End: 2024-04-03

## 2024-04-03 ENCOUNTER — TELEPHONE (OUTPATIENT)
Age: 59
End: 2024-04-03

## 2024-04-03 DIAGNOSIS — N18.1 STAGE 1 CHRONIC KIDNEY DISEASE: Primary | ICD-10-CM

## 2024-04-03 NOTE — TELEPHONE ENCOUNTER
Patient returning call regarding labs. Patient wanted to clarify she needed labs drawn today before appt tomorrow.

## 2024-04-03 NOTE — TELEPHONE ENCOUNTER
Patient called and was at a lab to get labs drawn. They informed the patient they didn't see her lab orders. I apologized to the patient and found a [facility] lab near her that was 6 minutes away. I gave her the address, and told her if she needs anything else to call back. No further questions at this time.
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
## 2024-04-04 ENCOUNTER — CONSULT (OUTPATIENT)
Dept: NEPHROLOGY | Facility: CLINIC | Age: 59
End: 2024-04-04

## 2024-04-04 ENCOUNTER — APPOINTMENT (OUTPATIENT)
Age: 59
End: 2024-04-04
Payer: COMMERCIAL

## 2024-04-04 ENCOUNTER — DOCUMENTATION (OUTPATIENT)
Dept: NEPHROLOGY | Facility: CLINIC | Age: 59
End: 2024-04-04

## 2024-04-04 VITALS
HEART RATE: 68 BPM | WEIGHT: 160 LBS | RESPIRATION RATE: 16 BRPM | DIASTOLIC BLOOD PRESSURE: 70 MMHG | OXYGEN SATURATION: 82 % | TEMPERATURE: 96.9 F | BODY MASS INDEX: 30.21 KG/M2 | SYSTOLIC BLOOD PRESSURE: 104 MMHG | HEIGHT: 61 IN

## 2024-04-04 DIAGNOSIS — R30.0 DYSURIA: Primary | ICD-10-CM

## 2024-04-04 DIAGNOSIS — N18.1 STAGE 1 CHRONIC KIDNEY DISEASE: ICD-10-CM

## 2024-04-04 DIAGNOSIS — N20.0 NEPHROLITHIASIS: Primary | ICD-10-CM

## 2024-04-04 LAB
ANION GAP SERPL CALCULATED.3IONS-SCNC: 7 MMOL/L (ref 4–13)
BACTERIA UR QL AUTO: ABNORMAL /HPF
BILIRUB UR QL STRIP: NEGATIVE
BUN SERPL-MCNC: 15 MG/DL (ref 5–25)
CALCIUM SERPL-MCNC: 9.1 MG/DL (ref 8.4–10.2)
CAOX CRY URNS QL MICRO: ABNORMAL /HPF
CHLORIDE SERPL-SCNC: 108 MMOL/L (ref 96–108)
CLARITY UR: CLEAR
CO2 SERPL-SCNC: 28 MMOL/L (ref 21–32)
COLOR UR: ABNORMAL
CREAT SERPL-MCNC: 0.73 MG/DL (ref 0.6–1.3)
CREAT UR-MCNC: 114.2 MG/DL
GFR SERPL CREATININE-BSD FRML MDRD: 91 ML/MIN/1.73SQ M
GLUCOSE P FAST SERPL-MCNC: 96 MG/DL (ref 65–99)
GLUCOSE UR STRIP-MCNC: NEGATIVE MG/DL
HGB UR QL STRIP.AUTO: ABNORMAL
KETONES UR STRIP-MCNC: NEGATIVE MG/DL
LEUKOCYTE ESTERASE UR QL STRIP: ABNORMAL
MICROALBUMIN UR-MCNC: 10.8 MG/L
MICROALBUMIN/CREAT 24H UR: 9 MG/G CREATININE (ref 0–30)
NITRITE UR QL STRIP: NEGATIVE
NON-SQ EPI CELLS URNS QL MICRO: ABNORMAL /HPF
PH UR STRIP.AUTO: 6.5 [PH]
POTASSIUM SERPL-SCNC: 4.5 MMOL/L (ref 3.5–5.3)
PROT UR STRIP-MCNC: ABNORMAL MG/DL
RBC #/AREA URNS AUTO: ABNORMAL /HPF
SODIUM SERPL-SCNC: 143 MMOL/L (ref 135–147)
SP GR UR STRIP.AUTO: 1.02 (ref 1–1.03)
UROBILINOGEN UR STRIP-ACNC: <2 MG/DL
WBC #/AREA URNS AUTO: ABNORMAL /HPF

## 2024-04-04 PROCEDURE — 36415 COLL VENOUS BLD VENIPUNCTURE: CPT

## 2024-04-04 PROCEDURE — 82043 UR ALBUMIN QUANTITATIVE: CPT

## 2024-04-04 PROCEDURE — 82570 ASSAY OF URINE CREATININE: CPT

## 2024-04-04 PROCEDURE — 80048 BASIC METABOLIC PNL TOTAL CA: CPT

## 2024-04-04 PROCEDURE — 81001 URINALYSIS AUTO W/SCOPE: CPT

## 2024-04-04 RX ORDER — CIPROFLOXACIN 500 MG/1
500 TABLET, FILM COATED ORAL EVERY 12 HOURS SCHEDULED
Qty: 10 TABLET | Refills: 0 | Status: SHIPPED | OUTPATIENT
Start: 2024-04-04 | End: 2024-04-09

## 2024-04-04 NOTE — PROGRESS NOTES
Labs [done on 4/4/2024]-called and left message for the patient.  Urine analysis showed dysuria and plan to start patient on Cipro 500 mg p.o. twice daily x 5 days.  Prescription was sent to local pharmacy.    Velasquez Claudio MD  Nephrology  4/4/2024

## 2024-04-04 NOTE — PROGRESS NOTES
NEPHROLOGY OFFICE CONSULT  Adrienne Mendes 58 y.o.female YOB: 1965 MRN: [id number]    Encounter: [id number] DATE: 4/4/2024    REASON FOR VISIT: Adrienne Mendes is a 58 y.o.female who was referred by  for further management of metabolic stone workup for further management of nephrolithiasis    HPI:    This is 58-year-old female with past medical history significant for thyroidectomy, referred for metabolic stone workup for further management of nephrolithiasis    According to patient in 2022 she was admitted to hospital in [place] where she was found to have right hydronephrosis and had underwent lithotripsy.  Patient had KUB done on 4/7/2022 and also renal ultrasound on 4/7/2022 showed bilateral nonobstructive renal calculi.  According to patient for last 2 years she was not evaluated by anybody and looking forward for metabolic stone workup at this point.  According to patient she has been having trouble with kidney stone for many years and had underwent lithotripsy is also in the past.    For last 2 months, patient has been noticing to have strong odor in the urine.  At this point patient is not taking any medications at home on regular basis.        REVIEW OF SYSTEMS:    Review of Systems   Constitutional:  Negative for chills and fever.   HENT:  Negative for nosebleeds.    Eyes:  Negative for photophobia and pain.   Respiratory:  Negative for choking.    Cardiovascular:  Negative for palpitations.   Gastrointestinal:  Negative for blood in stool.   Genitourinary:  Negative for hematuria.   Musculoskeletal:  Negative for neck stiffness.   Neurological:  Negative for seizures.   Psychiatric/Behavioral:  Negative for confusion and suicidal ideas.          PAST MEDICAL HISTORY:  History reviewed. No pertinent past medical history.    PAST SURGICAL HISTORY:  Past Surgical History:   Procedure Laterality Date    PARTIAL HYSTERECTOMY      THYROIDECTOMY         SOCIAL HISTORY:  Social History  "    Substance and Sexual Activity   Alcohol Use Not Currently     Social History     Substance and Sexual Activity   Drug Use Never     Social History     Tobacco Use   Smoking Status Never   Smokeless Tobacco Never       FAMILY HISTORY:  Family History   Problem Relation Age of Onset    No Known Problems Mother     Liver cancer Father        ALLERGY:  Allergies   Allergen Reactions    Sulfur Rash       MEDICATIONS:  No current outpatient medications on file.    PHYSICAL EXAM:  Vitals:    04/04/24 1321   BP: 104/70   Pulse: 68   Resp: 16   Temp: (!) 96.9 °F (36.1 °C)   TempSrc: Temporal   SpO2: (!) 82%   Weight: 72.6 kg (160 lb)   Height: 5' 1\" (1.549 m)     Body mass index is 30.23 kg/m².    Physical Exam  Constitutional:       General: She is not in acute distress.  HENT:      Right Ear: External ear normal.   Eyes:      General: No scleral icterus.     Conjunctiva/sclera:      Right eye: No hemorrhage.  Neck:      Thyroid: No thyromegaly.      Vascular: No JVD.   Cardiovascular:      Rate and Rhythm: Normal rate.   Pulmonary:      Effort: Pulmonary effort is normal. No accessory muscle usage or respiratory distress.      Breath sounds: No wheezing.   Abdominal:      General: There is no distension.   Musculoskeletal:      Right ankle: No swelling.      Left ankle: No swelling.   Skin:     General: Skin is warm.      Coloration: Skin is not jaundiced.   Neurological:      Mental Status: She is alert. She is not disoriented.   Psychiatric:         Speech: She is communicative.         Behavior: Behavior is not combative.         LAB RESULTS:  Results for orders placed or performed in visit on 11/01/22   TB Skin Test   Result Value Ref Range    TB Skin Test Negative     Induration 0n mm       ASSESSMENT and PLAN:  Adrienne was seen today for consult and chronic kidney disease.    Diagnoses and all orders for this visit:    Nephrolithiasis  -     Ambulatory Referral to Nephrology  -     Basic metabolic panel; " Future  -     Phosphorus; Future  -     Uric acid; Future  -     PTH, intact; Future  -     US kidney and bladder with pvr; Future      1.  Nephrolithiasis.  Exact etiology is currently unclear.    Patient has a history of kidney stone for many years and on 2/22/2022, patient had underwent CT scan of abdomen pelvis and found to have mild right hydronephrosis due to obstructive stone and had underwent lithotripsy.  KUB and renal ultrasound done on 4/7/2022 which I have reviewed results from [facility] showed bilateral nonobstructive renal calculi.  Patient is interested doing metabolic stone workup at this point.    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  Discussed with patient today that if found to have obstructive renal calculi, recommend referring to urology for possible lithotripsy in the future.    Advised patient to drink around 3 L of fluid on daily basis to maintain daily urine output for more than 2 L/day.  Also encourage patient to add lemon to the water which would be beneficial to help boost citrate level as a part of metabolic stone workup strategy.    Plan to check BMP, uric acid, PTH, phosphorus along with 24-hour urine collection as a part of metabolic stone workup    Discussed with patient that we will wait for those results to come back prior to discussing further regarding dietary modification versus medication use.    Patient is also been noticing strong odor in the urine for last 2 months and currently does not take any medication at home.  Discussed with patient that if found to have dysuria on urine analysis which is pending from this morning, would recommend use of antibiotic.    Returns to nephrology office in 3 months.  Plan to check BMP, phosphorus, uric acid, PTH along with 24-hour urine collection before next visit    Labs [done  "on 4/4/2024]-called and left message for the patient.  Urine analysis showed dysuria and plan to start patient on Cipro 500 mg p.o. twice daily x 5 days.  Prescription was sent to local pharmacy.    Labs [done on 4/4/2024]  Creatinine 0.73 with EGFR of 91.  Urine microalbumin to creatinine ratio 9 mg.  Normal sodium, potassium, bicarb and calcium.    Portions of the record may have been created with voice recognition software. Occasional wrong word or \"sound a like\" substitutions may have occurred due to the inherent limitations of voice recognition software. Read the chart carefully and recognize, using context, where substitutions have occurred.If you have any questions, please contact the dictating provider.   "

## 2024-04-05 ENCOUNTER — TELEPHONE (OUTPATIENT)
Dept: NEPHROLOGY | Facility: CLINIC | Age: 59
End: 2024-04-05

## 2024-04-05 NOTE — TELEPHONE ENCOUNTER
Called left voice message to advise patient as per Dr.Jwalant Claudio, that he has tried to call he yesterday and left her a message regarding finding of infection in the urine and has sent a course of antibiotic to the local pharmacy-Cipro BID daily for 5-days. also advised that her other blood work is looking normal including kidney function and electrolytes.

## 2024-05-02 ENCOUNTER — TELEPHONE (OUTPATIENT)
Dept: FAMILY MEDICINE CLINIC | Facility: CLINIC | Age: 59
End: 2024-05-02

## 2024-05-02 DIAGNOSIS — E04.1 THYROID NODULE: Primary | ICD-10-CM

## 2024-05-02 NOTE — TELEPHONE ENCOUNTER
Patient brought in paperwork of her pathology report of the Right thyroid lobe excision.  Also the Op Report for the Thyroid Nodule with dysphagia 11/11/2020.  Faxed to central fax to have put in chart.    She said you were waiting on these papers and would set her up with an endocrinologist in this area.

## 2024-05-24 ENCOUNTER — HOSPITAL ENCOUNTER (OUTPATIENT)
Dept: ULTRASOUND IMAGING | Facility: CLINIC | Age: 59
Discharge: HOME | End: 2024-05-24
Payer: COMMERCIAL

## 2024-05-24 DIAGNOSIS — N20.0 NEPHROLITHIASIS: ICD-10-CM

## 2024-05-24 PROCEDURE — 76770 US EXAM ABDO BACK WALL COMP: CPT

## 2024-06-04 ENCOUNTER — TELEPHONE (OUTPATIENT)
Dept: NEPHROLOGY | Facility: CLINIC | Age: 59
End: 2024-06-04

## 2024-06-04 NOTE — TELEPHONE ENCOUNTER
Familia Radiologist from [facility] called office to give Dr.Jwalant González MD abnormal findings  in patient's US of kidney and bladder done on 05/24/24. per familia small (R) kidney shows evidence of scarring and calculi. And (L) kidney shows mild fullness of the left renal collecting system calculi noted in the (L) kidney. recommendations will be MRI or CT Scan to evaluate considering poor visualization of the entire right kidney.

## 2024-06-05 DIAGNOSIS — R93.429 ABNORMAL RENAL ULTRASOUND: Primary | ICD-10-CM

## 2024-06-13 ENCOUNTER — TELEPHONE (OUTPATIENT)
Dept: NEPHROLOGY | Facility: CLINIC | Age: 59
End: 2024-06-13

## 2024-06-13 NOTE — TELEPHONE ENCOUNTER
LM to get litholink test prior to 07/22 appt. Asked pt to call us back if any questions or concerns.

## 2024-06-18 ENCOUNTER — APPOINTMENT (OUTPATIENT)
Age: 59
End: 2024-06-18
Payer: COMMERCIAL

## 2024-06-18 DIAGNOSIS — N20.0 NEPHROLITHIASIS: ICD-10-CM

## 2024-06-18 LAB
ANION GAP SERPL CALCULATED.3IONS-SCNC: 9 MMOL/L (ref 4–13)
BUN SERPL-MCNC: 13 MG/DL (ref 5–25)
CALCIUM SERPL-MCNC: 9.1 MG/DL (ref 8.4–10.2)
CHLORIDE SERPL-SCNC: 107 MMOL/L (ref 96–108)
CO2 SERPL-SCNC: 27 MMOL/L (ref 21–32)
CREAT SERPL-MCNC: 0.7 MG/DL (ref 0.6–1.3)
GFR SERPL CREATININE-BSD FRML MDRD: 95 ML/MIN/1.73SQ M
GLUCOSE P FAST SERPL-MCNC: 97 MG/DL (ref 65–99)
PHOSPHATE SERPL-MCNC: 3.5 MG/DL (ref 2.7–4.5)
POTASSIUM SERPL-SCNC: 4.4 MMOL/L (ref 3.5–5.3)
PTH-INTACT SERPL-MCNC: 78.9 PG/ML (ref 12–88)
SODIUM SERPL-SCNC: 143 MMOL/L (ref 135–147)
URATE SERPL-MCNC: 3.9 MG/DL (ref 2–7.5)

## 2024-06-18 PROCEDURE — 80048 BASIC METABOLIC PNL TOTAL CA: CPT

## 2024-06-18 PROCEDURE — 84550 ASSAY OF BLOOD/URIC ACID: CPT

## 2024-06-18 PROCEDURE — 84100 ASSAY OF PHOSPHORUS: CPT

## 2024-06-18 PROCEDURE — 36415 COLL VENOUS BLD VENIPUNCTURE: CPT

## 2024-06-18 PROCEDURE — 83970 ASSAY OF PARATHORMONE: CPT

## 2024-06-21 ENCOUNTER — HOSPITAL ENCOUNTER (OUTPATIENT)
Dept: CT IMAGING | Facility: CLINIC | Age: 59
Discharge: HOME | End: 2024-06-21
Payer: COMMERCIAL

## 2024-06-21 DIAGNOSIS — R93.429 ABNORMAL RENAL ULTRASOUND: ICD-10-CM

## 2024-06-21 PROCEDURE — 74178 CT ABD&PLV WO CNTR FLWD CNTR: CPT

## 2024-06-21 RX ADMIN — IOHEXOL 100 ML: 350 INJECTION, SOLUTION INTRAVENOUS at 11:43

## 2024-06-25 LAB
AMMONIA UR-SCNC: 29 MMOL/24 HR (ref 15–60)
CA H2 PHOS DIHYD 24H SATFR UR: 0.77 (ref 0.5–2)
CALCIUM 24H UR-MRATE: 241 MG/24 HR
CALCIUM/CREAT UR: 217 MG/G CREAT (ref 51–262)
CALCIUM/KG BODY WEIGHT: 3.3 MG/24 HR/KG
CAOX INDEX 24H UR-RTO: 3.05 (ref 6–10)
CHLORIDE 24H UR-SRATE: 161 MMOL/24 HR (ref 70–250)
CITRATE 24H UR-MCNC: 773 MG/24 HR
COMMENT: ABNORMAL
CREAT UR-MCNC: 1111 MG/24 HR
CREAT/BW 24H UR-RELMRAT: 15.3 MG/24 HR/KG (ref 8.7–20.3)
CYSTINE 24H UR-MCNC: ABNORMAL MG/DL
MAGNESIUM 24H UR-MRATE: 63 MG/24 HR (ref 30–120)
OXALATE UR-MCNC: 22 MG/24 HR (ref 20–40)
PH 24H UR: 6.43 [PH] (ref 5.8–6.2)
PHOSPHATE 24H UR-MRATE: 617 MG/24 HR (ref 600–1200)
POTASSIUM 24H UR-SCNC: 42 MMOL/24 HR (ref 20–100)
PROTEIN CATABOLIC RATE 24H UR-MRATE: 0.8 G/KG/24 HR (ref 0.8–1.4)
SODIUM 24H UR-SRATE: 161 MMOL/24 HR (ref 50–150)
SPECIMEN VOL 24H UR: 3130 ML/24 HR (ref 500–4000)
SULFATE 24H UR-SRATE: 23 MEQ/24 HR (ref 20–80)
URATE 24H SATFR UR: 0.15
URATE 24H UR-MRATE: 534 MG/24 HR
UUN 24H UR-MRATE: 7.42 G/24 HR (ref 6–14)

## 2024-06-27 ENCOUNTER — TELEPHONE (OUTPATIENT)
Age: 59
End: 2024-06-27

## 2024-07-01 ENCOUNTER — TELEPHONE (OUTPATIENT)
Age: 59
End: 2024-07-01

## 2024-07-01 NOTE — TELEPHONE ENCOUNTER
Patient called asking if we can mail her the results for the tests she had done prior to her upcoming appointment on 7/22 with Dr. Claudio (labs, US Kidney bladder)    Patient can't get into her mychart to see them and would like to look at them herself prior to her appointment.     Patient states we can mail to the following address:     [address]    [facility] [id number]

## 2024-07-10 NOTE — TELEPHONE ENCOUNTER
Patient called- she was reviewing her CAT scan results and it states that the ureters were no visualized. This is a huge problem for her because she has an issue with stones in her ureters which caused her go into sepsis and emergency surgery.    What would Dr. Claudio like to do about this? Please advise.

## 2024-07-10 NOTE — TELEPHONE ENCOUNTER
Called left voice message for patient advised per Dr.Jwalant González MD.  that based on her CT scan done on 06/21/24 she does  have visualized ureter and not enlarged which is normal. advised if she had any concerns or question to please call office at provided phone # [phone].

## 2024-07-12 ENCOUNTER — TELEPHONE (OUTPATIENT)
Age: 59
End: 2024-07-12

## 2024-07-12 NOTE — TELEPHONE ENCOUNTER
Patient calling to request her labs and urine test from 4/4/24 and the kidney and bladder ultrasound from 5/24/24 be mailed to her [phone] for her upcoming appt.  Please advise

## 2024-07-15 ENCOUNTER — TELEPHONE (OUTPATIENT)
Age: 59
End: 2024-07-15

## 2024-07-15 NOTE — TELEPHONE ENCOUNTER
Patient calling to let the doctor know she recently was in the ED w/ UTI. She had a bunch of labs and testing done prior that she would like reviewed and to be called back with any recommendations.   Please advise

## 2024-07-15 NOTE — TELEPHONE ENCOUNTER
Called patient unable to leave voice message patient's mailbox is full. per Dr.Adeem Pilo MD on call provider has also tried to call patient to advise her that her kidney functions are normal and that her CT scan of abdomen did not reveal any kidney stones which may be obstructing her urine flow. Small kidney stones however were seen and that is why she had seen Dr. Claudio in the past.

## 2024-07-18 ENCOUNTER — OFFICE VISIT (OUTPATIENT)
Dept: FAMILY MEDICINE CLINIC | Facility: CLINIC | Age: 59
End: 2024-07-18
Payer: COMMERCIAL

## 2024-07-18 VITALS
SYSTOLIC BLOOD PRESSURE: 110 MMHG | BODY MASS INDEX: 30.23 KG/M2 | OXYGEN SATURATION: 100 % | TEMPERATURE: 97.2 F | HEART RATE: 52 BPM | DIASTOLIC BLOOD PRESSURE: 70 MMHG | HEIGHT: 61 IN

## 2024-07-18 DIAGNOSIS — N30.00 ACUTE CYSTITIS WITHOUT HEMATURIA: Primary | ICD-10-CM

## 2024-07-18 DIAGNOSIS — N20.0 NEPHROLITHIASIS: ICD-10-CM

## 2024-07-18 DIAGNOSIS — Z87.442 HISTORY OF KIDNEY STONES: ICD-10-CM

## 2024-07-18 PROCEDURE — 99214 OFFICE O/P EST MOD 30 MIN: CPT | Performed by: STUDENT IN AN ORGANIZED HEALTH CARE EDUCATION/TRAINING PROGRAM

## 2024-07-18 RX ORDER — ONDANSETRON 4 MG/1
4 TABLET, ORALLY DISINTEGRATING ORAL EVERY 8 HOURS PRN
COMMUNITY
Start: 2024-07-13 | End: 2024-07-23

## 2024-07-18 RX ORDER — CEFPODOXIME PROXETIL 200 MG/1
200 TABLET, FILM COATED ORAL 2 TIMES DAILY
COMMUNITY
Start: 2024-07-13 | End: 2024-07-23

## 2024-07-18 NOTE — PROGRESS NOTES
Assessment/Plan:         Problem List Items Addressed This Visit        Genitourinary    Nephrolithiasis   Other Visit Diagnoses     Acute cystitis without hematuria    -  Primary    History of kidney stones              reviewed ER notes and prior imaging related to the 1.2cm stone that became obstructing. Some renal atrophy seen in the R kidney, [facility] and dorinanue to follow up with nephrology recommendations to help minimize stone creation and burden    Subjective:      Patient ID: Adrienne Mendes is a 58 y.o. female.    HPI    Coming in for a follow up. Diagnosed with uti at [facility] ER and started on Cefpodozime. Symptoms are improving and has been fever free x24-48 hours. Is worried about this as she had a 1.2cm stone become lodged followed by admission for stone retreval and sepsis. Has recovered from this outside of renal atrophy seen in the R kidney     The following portions of the patient's history were reviewed and updated as appropriate:   Past Medical History:  She has no past medical history on file.,  _______________________________________________________________________  Medical Problems:  does not have any pertinent problems on file.,  _______________________________________________________________________  Past Surgical History:   has a past surgical history that includes Thyroidectomy and Partial hysterectomy.,  _______________________________________________________________________  Family History:  family history includes Liver cancer in her father; No Known Problems in her mother.,  _______________________________________________________________________  Social History:   reports that she has never smoked. She has never used smokeless tobacco. She reports that she does not currently use alcohol. She reports that she does not use drugs.,  _______________________________________________________________________  Allergies:  is allergic to  "sulfur..  _______________________________________________________________________  Current Outpatient Medications   Medication Sig Dispense Refill   • cefpodoxime (VANTIN) 200 mg tablet Take 200 mg by mouth 2 (two) times a day     • ondansetron (ZOFRAN-ODT) 4 mg disintegrating tablet Take 4 mg by mouth every 8 (eight) hours as needed       No current facility-administered medications for this visit.     _______________________________________________________________________  Review of Systems   Constitutional:  Negative for activity change, appetite change, fatigue and fever.   HENT:  Negative for congestion, rhinorrhea and sore throat.    Eyes:  Negative for visual disturbance.   Respiratory:  Negative for cough and shortness of breath.    Cardiovascular:  Negative for chest pain.   Gastrointestinal:  Negative for nausea and vomiting.         Objective:  Vitals:    07/18/24 0825   BP: 110/70   Pulse: (!) 52   Temp: (!) 97.2 °F (36.2 °C)   SpO2: 100%   Height: 5' 1\" (1.549 m)     Body mass index is 30.23 kg/m².     Physical Exam  Constitutional:       Appearance: Normal appearance.   Pulmonary:      Effort: Pulmonary effort is normal. No respiratory distress.   Neurological:      Mental Status: She is alert.   Psychiatric:         Mood and Affect: Mood normal.         Behavior: Behavior normal.         Thought Content: Thought content normal.       I have spent a total time of 30 minutes in caring for this patient on the day of the visit/encounter including Prognosis, Risks and benefits of tx options, Patient and family education, Documenting in the medical record, Reviewing / ordering tests, medicine, procedures  , and Obtaining or reviewing history  .      "

## 2024-07-19 ENCOUNTER — TELEPHONE (OUTPATIENT)
Dept: NEPHROLOGY | Facility: CLINIC | Age: 59
End: 2024-07-19

## 2024-07-22 ENCOUNTER — OFFICE VISIT (OUTPATIENT)
Dept: NEPHROLOGY | Facility: CLINIC | Age: 59
End: 2024-07-22
Payer: COMMERCIAL

## 2024-07-22 VITALS
RESPIRATION RATE: 16 BRPM | OXYGEN SATURATION: 95 % | HEART RATE: 56 BPM | DIASTOLIC BLOOD PRESSURE: 78 MMHG | HEIGHT: 61 IN | SYSTOLIC BLOOD PRESSURE: 110 MMHG | BODY MASS INDEX: 30.21 KG/M2 | TEMPERATURE: 97.1 F | WEIGHT: 160 LBS

## 2024-07-22 DIAGNOSIS — N20.0 NEPHROLITHIASIS: Primary | ICD-10-CM

## 2024-07-22 DIAGNOSIS — R82.994 HYPERCALCIURIA: ICD-10-CM

## 2024-07-22 PROCEDURE — 99213 OFFICE O/P EST LOW 20 MIN: CPT | Performed by: INTERNAL MEDICINE

## 2024-07-22 NOTE — PROGRESS NOTES
NEPHROLOGY OFFICE FOLLOW-UP  Adrienne Mendes 58 y.o.female YOB: 1965 MRN: [id number]    Encounter: [id number] DATE: 7/22/2024    REASON FOR VISIT: Adrienne Mendes is a 58 y.o.female returns to the nephrology office for further management of metabolic stone workup for further management of nephrolithiasis    HPI:    This is 58-year-old female with past medical history significant for thyroidectomy, returns to the nephrology office for metabolic stone workup for further management of nephrolithiasis    According to patient in 2022 she was admitted to hospital in [place] where she was found to have right hydronephrosis and had underwent lithotripsy.  Patient had KUB done on 4/7/2022 and also renal ultrasound on 4/7/2022 showed bilateral nonobstructive renal calculi.  According to patient for last 2 years she was not evaluated by anybody and looking forward for metabolic stone workup at this point.  According to patient she has been having trouble with kidney stone for many years and had underwent lithotripsy is also in the past.    Patient had initially underwent renal ultrasound on 5/24/2024 which showed small right kidney with evidence of scarring and calculi and also found to have mild fullness of left renal collecting system with kidney stone.  Patient had underwent CT renal protocol on 6/21/2024 showed bilateral nonobstructive renal calculi measuring up to 5 mm on right and 4 mm on left side without any hydronephrosis.  Right renal cortical scarring also seen likely sequelae of prior infection.    Patient recently developed urinary tract infection and currently taking antibiotic and feeling much better.    Patient was previously taking multivitamin which she has stopped while she is taking antibiotics.        REVIEW OF SYSTEMS:    Review of Systems   Constitutional:  Negative for chills and fever.   HENT:  Negative for nosebleeds.    Eyes:  Negative for photophobia and pain.   Respiratory:  Negative  "for choking.    Cardiovascular:  Negative for palpitations.   Gastrointestinal:  Negative for blood in stool.   Genitourinary:  Negative for hematuria.   Musculoskeletal:  Negative for neck stiffness.   Neurological:  Negative for seizures.   Psychiatric/Behavioral:  Negative for confusion and suicidal ideas.          PAST MEDICAL HISTORY:  History reviewed. No pertinent past medical history.    PAST SURGICAL HISTORY:  Past Surgical History:   Procedure Laterality Date    PARTIAL HYSTERECTOMY      THYROIDECTOMY         SOCIAL HISTORY:  Social History     Substance and Sexual Activity   Alcohol Use Not Currently     Social History     Substance and Sexual Activity   Drug Use Never     Social History     Tobacco Use   Smoking Status Never   Smokeless Tobacco Never       FAMILY HISTORY:  Family History   Problem Relation Age of Onset    No Known Problems Mother     Liver cancer Father        ALLERGY:  Allergies   Allergen Reactions    Sulfur Rash       MEDICATIONS:    Current Outpatient Medications:     cefpodoxime (VANTIN) 200 mg tablet, Take 200 mg by mouth 2 (two) times a day, Disp: , Rfl:     ondansetron (ZOFRAN-ODT) 4 mg disintegrating tablet, Take 4 mg by mouth every 8 (eight) hours as needed, Disp: , Rfl:     PHYSICAL EXAM:  Vitals:    07/22/24 1311   BP: 110/78   Pulse: 56   Resp: 16   Temp: (!) 97.1 °F (36.2 °C)   TempSrc: Temporal   SpO2: 95%   Weight: 72.6 kg (160 lb)   Height: 5' 1\" (1.549 m)     Body mass index is 30.23 kg/m².    Physical Exam  Constitutional:       General: She is not in acute distress.  HENT:      Right Ear: External ear normal.   Eyes:      General: No scleral icterus.     Conjunctiva/sclera:      Right eye: No hemorrhage.  Neck:      Thyroid: No thyromegaly.      Vascular: No JVD.   Cardiovascular:      Rate and Rhythm: Normal rate.   Pulmonary:      Effort: Pulmonary effort is normal. No accessory muscle usage or respiratory distress.      Breath sounds: No wheezing.   Abdominal:      " General: There is no distension.   Musculoskeletal:      Right ankle: No swelling.      Left ankle: No swelling.   Skin:     General: Skin is warm.      Coloration: Skin is not jaundiced.   Neurological:      Mental Status: She is alert. She is not disoriented.   Psychiatric:         Speech: She is communicative.         Behavior: Behavior is not combative.         LAB RESULTS:  Results for orders placed or performed in visit on 06/18/24   [facility] 24Hr Urine Panel   Result Value Ref Range    Cystine, Jose. Urine Neg Negative    Urine Volume (Preserved) 3,130 500 - 4,000 mL/24 hr    Calcium Oxalate Saturation 3.05 (L) 6.00 - 10.00    Calcium 24HR Ur 241 (H) <200 mg/24 hr    Oxalate, Ur 22 20 - 40 mg/24 hr    Citrate, Ur 773 >550 mg/24 hr    Calcium Phosphate Saturation 0.77 0.50 - 2.00    pH, 24 Hr Urine 6.429 (H) 5.800 - 6.200    Uric Acid Saturation 0.15 <1.00    Uric Acid, 24HR Ur 534 <750 mg/24 hr    Sodium, 24HR Ur 161 (H) 50 - 150 mmol/24 hr    Potassium, 24HR Ur 42 20 - 100 mmol/24 hr    Magnesium, 24H Ur 63 30 - 120 mg/24 hr    Phosphorus, 24HR Ur 617 600 - 1,200 mg/24 hr    Ammonia, Urine 29 15 - 60 mmol/24 hr    Chloride 24HR Ur 161 70 - 250 mmol/24 hr    Sulfate, Urine 23 20 - 80 meq/24 hr    Urea Nitrogen, 24H Ur 7.42 6.00 - 14.00 g/24 hr    Protein Catabolic Rate 0.8 0.8 - 1.4 g/kg/24 hr    Creatinine, Urine 1,111 Not Applic. mg/24 hr    Creatinine/Kg Body Weight 15.3 8.7 - 20.3 mg/24 hr/kg    Calcium/Kg Body Weight 3.3 <4.0 mg/24 hr/kg    Calcium/Creat.Ratio 217 51 - 262 mg/g creat    COMMENT Note        ASSESSMENT and PLAN:  Adrienne was seen today for follow-up and nephrolithiasis.    Diagnoses and all orders for this visit:    Nephrolithiasis  -     Basic metabolic panel; Future  -     Uric acid; Future  -     Phosphorus; Future  -     PTH, intact; Future  -     US kidney and bladder with pvr; Future    Hypercalciuria  -     Basic metabolic panel; Future  -     Phosphorus; Future  -     PTH, intact;  "Future      1.  Nephrolithiasis.  Multifactorial and suspected due to hypercalciuria.    Patient had initially underwent renal ultrasound on 5/24/2024 which showed small right kidney with evidence of scarring and calculi and also found to have mild fullness of left renal collecting system with kidney stone.  Patient had underwent CT renal protocol on 6/21/2024 showed bilateral nonobstructive renal calculi measuring up to 5 mm on right and 4 mm on left side without any hydronephrosis.  Right renal cortical scarring also seen likely sequelae of prior infection.    Recent 24-hour urine collection showed total urine volume of 3.13 L which is adequate and encourage patient to continue drink at least 3 L of water on daily basis to maintain daily urine output more than 2 L.    Recent 24-hour urine collection showed hypercalciuria with 24-hour urine excretion of 241 which is on the higher side.  Patient also found to have elevated urinary sodium level of 161 and encourage patient to follow low-salt diet.  Previously patient was taking multivitamin which she has stopped while she is taking antibiotic for management of UTI.  In the setting of hypercalciuria, encourage patient to avoid multivitamin and calcium supplementation.  Recent PTH level is 78 which is at goal which rules out hyperparathyroidism as the etiology of hypercalciuria.  Discussed with patient that in the future if found to have persistent hypercalciuria, may consider use of hydrochlorothiazide.    Patient has adequate SS calcium phosphate 0.77, SS uric acid 0.15.    Returns to the nephrology office in 1 year.  Plan to check BMP, phosphorus, uric acid, PTH, 24-hour urine collection and renal ultrasound before next visit        Portions of the record may have been created with voice recognition software. Occasional wrong word or \"sound a like\" substitutions may have occurred due to the inherent limitations of voice recognition software. Read the chart carefully " and recognize, using context, where substitutions have occurred.If you have any questions, please contact the dictating provider.

## 2024-08-13 NOTE — PROGRESS NOTES
New Patient Progress Note     CC: Endocrinology follow up visit    Impression & Plan:    Problem List Items Addressed This Visit       S/P partial thyroidectomy - Primary     Reviewed patient history and pathology reports.     Status post right thyroid lobe excision in November 2020.  Final pathology report benign follicular adenomatous nodule.    Denies history of radiation exposure to head or neck.  Denies compressive symptoms.  Denies hypothyroidism symptoms.  Will check a TSH and a free T4.             Relevant Orders    TSH, 3rd generation with Free T4 reflex       History of Present Illness:     Adrienne Mendes is a 58 y.o. female with a history of thyroid nodules presenting today to [facility].    Patient underwent a R thyroid lobe excision for thyroid nodules in November of 2020 in [place], [place]. (Final path report scanned under Media 5/2/2024).    Patient does not recall exactly how initial nodules were discovered.  No family history of thyroid cancer.  No history of radiation to the head or neck.  Patient reports she did have some compressive symptoms at the time including difficulty swallowing.  Ultimately, patient decided to have hemithyroidectomy.  Results were benign.  She denies symptoms of hypothyroidism including unexplained weight gain, profound fatigue, constipation, cold intolerance, and hair loss.      Patient Active Problem List   Diagnosis    Kidney disease    Nephrolithiasis    Hypercalciuria    S/P partial thyroidectomy      History reviewed. No pertinent past medical history.   Past Surgical History:   Procedure Laterality Date    PARTIAL HYSTERECTOMY      THYROIDECTOMY        Family History   Problem Relation Age of Onset    No Known Problems Mother     Liver cancer Father      Social History     Tobacco Use    Smoking status: Never     Passive exposure: Never    Smokeless tobacco: Never   Substance Use Topics    Alcohol use: Not Currently     Allergies   Allergen Reactions    Sulfur  "Rash     No current outpatient medications on file.    Review of Systems   Constitutional:  Negative for activity change, appetite change, fatigue and unexpected weight change.   HENT:  Negative for dental problem, sore throat, trouble swallowing and voice change.    Eyes:  Negative for visual disturbance.   Respiratory:  Negative for cough, chest tightness and shortness of breath.    Cardiovascular:  Negative for chest pain, palpitations and leg swelling.   Gastrointestinal:  Negative for constipation.   Endocrine: Negative for cold intolerance and heat intolerance.   Musculoskeletal:  Negative for arthralgias, back pain, joint swelling and myalgias.   Allergic/Immunologic: Positive for environmental allergies. Negative for food allergies.   Neurological:  Negative for dizziness, weakness, light-headedness, numbness and headaches.   Psychiatric/Behavioral:  Negative for decreased concentration, dysphoric mood and sleep disturbance. The patient is not nervous/anxious.        Physical Exam:  Body mass index is 30.87 kg/m².  /62 (BP Location: Right arm, Patient Position: Sitting, Cuff Size: Standard)   Pulse 59   Temp 98.6 °F (37 °C) (Temporal)   Resp 16   Ht 5' 1\" (1.549 m)   Wt 74.1 kg (163 lb 6.4 oz)   SpO2 98%   BMI 30.87 kg/m²    Wt Readings from Last 3 Encounters:   08/14/24 74.1 kg (163 lb 6.4 oz)   07/22/24 72.6 kg (160 lb)   04/04/24 72.6 kg (160 lb)       Physical Exam  Vitals reviewed.   Constitutional:       General: She is not in acute distress.     Appearance: She is well-developed. She is obese. She is not ill-appearing.   HENT:      Head: Normocephalic and atraumatic.   Eyes:      Pupils: Pupils are equal, round, and reactive to light.   Neck:      Thyroid: No thyroid mass, thyromegaly or thyroid tenderness.      Comments: L lobe palpable without tenderness, nodularity/mass, or thyromegaly  Cardiovascular:      Rate and Rhythm: Normal rate.      Pulses: Normal pulses.   Pulmonary:      " Effort: Pulmonary effort is normal.   Musculoskeletal:      Cervical back: Normal range of motion and neck supple.      Right lower leg: No edema.      Left lower leg: No edema.   Lymphadenopathy:      Cervical: No cervical adenopathy.   Skin:     General: Skin is warm and dry.      Capillary Refill: Capillary refill takes less than 2 seconds.   Neurological:      Mental Status: She is alert and oriented to person, place, and time.      Gait: Gait normal.   Psychiatric:         Mood and Affect: Mood normal.         Behavior: Behavior normal.         Labs:       Discussed with the patient and all questioned fully answered. She will call me if any problems arise.    Follow-up appointment in 12 months.     Counseled patient on diagnostic results, prognosis, risk and benefit of treatment options, instruction for management, importance of treatment compliance, Risk  factor reduction and impressions    VIKTORIA Mendoza

## 2024-08-14 ENCOUNTER — CONSULT (OUTPATIENT)
Dept: ENDOCRINOLOGY | Facility: CLINIC | Age: 59
End: 2024-08-14
Payer: COMMERCIAL

## 2024-08-14 VITALS
HEIGHT: 61 IN | BODY MASS INDEX: 30.85 KG/M2 | OXYGEN SATURATION: 98 % | WEIGHT: 163.4 LBS | SYSTOLIC BLOOD PRESSURE: 112 MMHG | TEMPERATURE: 98.6 F | RESPIRATION RATE: 16 BRPM | HEART RATE: 59 BPM | DIASTOLIC BLOOD PRESSURE: 62 MMHG

## 2024-08-14 DIAGNOSIS — E89.0 S/P PARTIAL THYROIDECTOMY: Primary | ICD-10-CM

## 2024-08-14 PROCEDURE — 99203 OFFICE O/P NEW LOW 30 MIN: CPT | Performed by: NURSE PRACTITIONER

## 2024-08-14 NOTE — ASSESSMENT & PLAN NOTE
Reviewed patient history and pathology reports.     Status post right thyroid lobe excision in November 2020.  Final pathology report benign follicular adenomatous nodule.    Denies history of radiation exposure to head or neck.  Denies compressive symptoms.  Denies hypothyroidism symptoms.  Will check a TSH and a free T4.

## 2024-08-30 ENCOUNTER — TELEPHONE (OUTPATIENT)
Age: 59
End: 2024-08-30

## 2024-08-30 DIAGNOSIS — M25.532 PAIN IN BOTH WRISTS: Primary | ICD-10-CM

## 2024-08-30 DIAGNOSIS — M25.531 PAIN IN BOTH WRISTS: Primary | ICD-10-CM

## 2024-08-30 NOTE — TELEPHONE ENCOUNTER
Patient would like a referral to an orthopedic for ongoing hand wrist arthritis , if possible if you can mail the referral to her and let her know when it is on the way

## 2024-09-13 ENCOUNTER — OFFICE VISIT (OUTPATIENT)
Dept: OBGYN CLINIC | Facility: CLINIC | Age: 59
End: 2024-09-13
Payer: COMMERCIAL

## 2024-09-13 ENCOUNTER — HOSPITAL ENCOUNTER (OUTPATIENT)
Dept: RADIOLOGY | Facility: HOSPITAL | Age: 59
End: 2024-09-13
Attending: STUDENT IN AN ORGANIZED HEALTH CARE EDUCATION/TRAINING PROGRAM
Payer: COMMERCIAL

## 2024-09-13 VITALS — WEIGHT: 157.2 LBS | HEIGHT: 61 IN | BODY MASS INDEX: 29.68 KG/M2

## 2024-09-13 DIAGNOSIS — M25.531 PAIN IN BOTH WRISTS: ICD-10-CM

## 2024-09-13 DIAGNOSIS — M25.532 PAIN IN BOTH WRISTS: ICD-10-CM

## 2024-09-13 DIAGNOSIS — M18.0 ARTHRITIS OF CARPOMETACARPAL (CMC) JOINT OF BOTH THUMBS: Primary | ICD-10-CM

## 2024-09-13 DIAGNOSIS — M65.331 TRIGGER MIDDLE FINGER OF RIGHT HAND: ICD-10-CM

## 2024-09-13 PROCEDURE — 99204 OFFICE O/P NEW MOD 45 MIN: CPT | Performed by: STUDENT IN AN ORGANIZED HEALTH CARE EDUCATION/TRAINING PROGRAM

## 2024-09-13 PROCEDURE — 20600 DRAIN/INJ JOINT/BURSA W/O US: CPT | Performed by: STUDENT IN AN ORGANIZED HEALTH CARE EDUCATION/TRAINING PROGRAM

## 2024-09-13 PROCEDURE — 20550 NJX 1 TENDON SHEATH/LIGAMENT: CPT | Performed by: STUDENT IN AN ORGANIZED HEALTH CARE EDUCATION/TRAINING PROGRAM

## 2024-09-13 PROCEDURE — 73130 X-RAY EXAM OF HAND: CPT

## 2024-09-13 RX ADMIN — BETAMETHASONE SODIUM PHOSPHATE AND BETAMETHASONE ACETATE 6 MG: 3; 3 INJECTION, SUSPENSION INTRA-ARTICULAR; INTRALESIONAL; INTRAMUSCULAR; SOFT TISSUE at 11:45

## 2024-09-13 RX ADMIN — ROPIVACAINE HYDROCHLORIDE 1 ML: 2 INJECTION, SOLUTION EPIDURAL; INFILTRATION; PERINEURAL at 11:45

## 2024-09-13 RX ADMIN — ROPIVACAINE HYDROCHLORIDE 1 ML: 5 INJECTION, SOLUTION EPIDURAL; INFILTRATION; PERINEURAL at 11:45

## 2024-09-13 NOTE — PROGRESS NOTES
ORTHOPAEDIC HAND, WRIST, AND ELBOW OFFICE  VISIT      ASSESSMENT/PLAN:      Diagnoses and all orders for this visit:    Arthritis of carpometacarpal (CMC) joint of both thumbs  -     Small joint arthrocentesis: L thumb CMC  -     Small joint arthrocentesis: R thumb CMC  -     Ambulatory Referral to PT/OT Hand Therapy; Future    Pain in both wrists  -     Ambulatory Referral to Orthopedic Surgery  -     XR hand 3+ vw right; Future  -     XR hand 3+ vw left; Future    Trigger middle finger of right hand  -     Ambulatory Referral to PT/OT Hand Therapy; Future  -     Hand/upper extremity injection: R long A1          58 y.o. female with bilateral thumb CMC OA left greater than right and possible early right long trigger finger     X-rays were reviewed in the office today which demonstrate bilateral thumb CMC OA left greater than right.  Treatment options were discussed in the form of bracing, formal therapy, and possible steroid injections.   Treatment options and expected outcomes were discussed.  The patient verbalized understanding of exam findings and treatment plan.   The patient was given the opportunity to ask questions.  Questions were answered to the patient's satisfaction.  The patient decided to move forward with formal therapy, bracing, and injections.  A referral was provided to formal therapy in the office today.  She was instructed to get comfort cool braces OTC.  She consented and underwent bilateral thumb CMC injections in the office today without any complications.  Discussed surgical intervention would be in the form of thumb CMC arthroplasty.       Follow Up:  3 months       To Do Next Visit:  Re-evaluation of current issue      Discussions:  Thumb CMC Arthritis: The anatomy and physiology of carpometacarpal joint arthritis was discussed with the patient today in the office.  Deterioration of the articular cartilage eventually leads to hypermobility at the thumb CMC joint, resulting in joint  subluxation, osteophyte formation, cystic changes within the trapezium and base of the first metacarpal, as well as subchondral sclerosis.  Eventually, pain, limited mobility, and compensatory hyperextension at the metacarpophalangeal joint may develop.  While normal activity and usage of the thumb joint may provide a painful experience to the patient, this typically does not result in damage to the thumb or hand.  Treatment options include resting thumb spica splints to decreased joint edema, pain, and inflammation.  Therapy exercises to strengthen the thenar musculature may relieve pain, but do not alter the overall continued development of osteoarthritis.  Oral medications, topical medications, corticosteroid injections may decrease pain and increase overall function.  Eventually, approximately 5% of patients may require surgical intervention.  and Trigger Finger: The anatomy and physiology of trigger finger was discussed with the patient today in the office.  Edema and increased contact pressure within the flexor tendons at the A1 pulley can cause pain, crepitation, and triggering or locking of the digit resulting in limitation of function.  Symptoms can occur at anytime but are typically worse in the morning or after a brief rest from repetitive activity.  Treatment options include resting/nighttime MP blocking splints to decrease edema, oral anti-inflammatory medications, home or formal therapy exercises, up to 2 steroid injections within the tendon sheath, or surgical release.  While majority of patients do respond to conservative treatment, up to 20% may require surgical release.       Michael Moeller MD  Attending, Orthopaedic Surgery  Hand, Wrist, and Elbow Surgery  [facility]    ______________________________________________________________________________________________    CHIEF COMPLAINT:  Chief Complaint   Patient presents with    Right Wrist - Pain    Left Wrist - Pain  "      SUBJECTIVE:  Patient is a 58 y.o. RHD female who presents today for evaluation and treatment of bilateral hand pain. The patient states her right is worse than her left. She notes pain to the base of both thumbs. She was treating in [place] previously and had two injections in her left hand. She denies prior injections on the right. She notes locking to her right long and ring fingers as well. She notes increased pain with pinch and . She has been using OTC braces and compression gloves. She also has been using Voltaren Gel. Referred by VIKTORIA Yap for evaluation.     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 No pertinent past medical history.    PAST SURGICAL HISTORY:  Past Surgical History:   Procedure Laterality Date    PARTIAL HYSTERECTOMY      THYROIDECTOMY         FAMILY HISTORY:  Family History   Problem Relation Age of Onset    No Known Problems Mother     Liver cancer Father        SOCIAL HISTORY:  Social History     Tobacco Use    Smoking status: Never     Passive exposure: Never    Smokeless tobacco: Never   Substance Use Topics    Alcohol use: Not Currently    Drug use: Never       MEDICATIONS:  No current outpatient medications on file.    ALLERGIES:  Allergies   Allergen Reactions    Sulfur Rash           REVIEW OF SYSTEMS:  Musculoskeletal:        As noted in HPI.   All other systems reviewed and are negative.    VITALS:  There were no vitals filed for this visit.    LABS:  HgA1c: No results found for: \"HGBA1C\"  BMP:   Lab Results   Component Value Date    CALCIUM 9.4 07/12/2024    K 3.6 07/12/2024    CO2 23 07/12/2024     07/12/2024    BUN 15 07/12/2024    CREATININE 0.70 07/12/2024       _____________________________________________________  PHYSICAL EXAMINATION:  General: Well developed and well nourished, alert & oriented x " "3, appears comfortable  Psychiatric: Normal  HEENT: Normocephalic, Atraumatic Trachea Midline, No torticollis  Pulmonary: No audible wheezing or respiratory distress   Abdomen/GI: Non tender, non distended   Cardiovascular: No pitting edema, 2+ radial pulse   Skin: No masses, erythema, lacerations, fluctation, ulcerations  Neurovascular: Sensation Intact to the Median, Ulnar, Radial Nerve, Motor Intact to the Median, Ulnar, Radial Nerve, and Pulses Intact  Musculoskeletal: Normal, except as noted in detailed exam and in HPI.      MUSCULOSKELETAL EXAMINATION:  Right hand  TTP thumb CMC  + click long finger  No obvious locking    Left hand  TTP thumb CMC    ___________________________________________________  STUDIES REVIEWED:  Xrays of the bilateral hand were reviewed and independently interpreted in PACS by Dr. Moeller and demonstrate bilateral thumb CMC arthritis left greater than right.           PROCEDURES PERFORMED:  Small joint arthrocentesis: L thumb CMC  [place]illiard Protocol:  Consent: Verbal consent obtained.  Risks and benefits: risks, benefits and alternatives were discussed  Consent given by: patient  Time out: Immediately prior to procedure a \"time out\" was called to verify the correct patient, procedure, equipment, support staff and site/side marked as required.  Patient understanding: patient states understanding of the procedure being performed  Patient identity confirmed: verbally with patient  Supporting Documentation  Indications: pain   Procedure Details  Location: thumb - L thumb CMC  Needle size: 25 G  Ultrasound guidance: no  Approach: volar  Medications administered: 6 mg betamethasone acetate-betamethasone sodium phosphate 6 (3-3) mg/mL; 1 mL ropivacaine 0.2 %    Patient tolerance: patient tolerated the procedure well with no immediate complications  Dressing:  Sterile dressing applied      Small joint arthrocentesis: R thumb CMC  [place]ard Protocol:  Consent: Verbal consent obtained.  Risks " "and benefits: risks, benefits and alternatives were discussed  Consent given by: patient  Patient understanding: patient states understanding of the procedure being performed  Patient identity confirmed: verbally with patient  Supporting Documentation  Indications: pain   Procedure Details  Location: thumb - R thumb CMC  Needle size: 25 G  Ultrasound guidance: no  Approach: volar  Medications administered: 6 mg betamethasone acetate-betamethasone sodium phosphate 6 (3-3) mg/mL; 1 mL ropivacaine 0.2 %    Patient tolerance: patient tolerated the procedure well with no immediate complications  Dressing:  Sterile dressing applied      Hand/upper extremity injection: R long A1  Universal Protocol:  Consent: Verbal consent obtained.  Risks and benefits: risks, benefits and alternatives were discussed  Consent given by: patient  Time out: Immediately prior to procedure a \"time out\" was called to verify the correct patient, procedure, equipment, support staff and site/side marked as required.  Patient understanding: patient states understanding of the procedure being performed  Site marked: the operative site was marked  Patient identity confirmed: verbally with patient  Supporting Documentation  Indications: tendon swelling   Procedure Details  Condition:trigger finger Location: long finger - R long A1   Preparation: Patient was prepped and draped in the usual sterile fashion  Needle size: 25 G  Approach: volar  Medications administered: 6 mg betamethasone acetate-betamethasone sodium phosphate 6 (3-3) mg/mL; 1 mL ropivacaine 0.5 %  Patient tolerance: patient tolerated the procedure well with no immediate complications  Dressing:  Sterile dressing applied              _____________________________________________________      Scribe Attestation      I,:  Tsering Calderon MA am acting as a scribe while in the presence of the attending physician.:       I,:  Michael Moeller MD personally performed the services described " in this documentation    as scribed in my presence.:

## 2024-09-14 RX ORDER — BETAMETHASONE SODIUM PHOSPHATE AND BETAMETHASONE ACETATE 3; 3 MG/ML; MG/ML
6 INJECTION, SUSPENSION INTRA-ARTICULAR; INTRALESIONAL; INTRAMUSCULAR; SOFT TISSUE
Status: COMPLETED | OUTPATIENT
Start: 2024-09-13 | End: 2024-09-13

## 2024-09-14 RX ORDER — ROPIVACAINE HYDROCHLORIDE 2 MG/ML
1 INJECTION, SOLUTION EPIDURAL; INFILTRATION; PERINEURAL
Status: COMPLETED | OUTPATIENT
Start: 2024-09-13 | End: 2024-09-13

## 2024-09-14 RX ORDER — ROPIVACAINE HYDROCHLORIDE 5 MG/ML
1 INJECTION, SOLUTION EPIDURAL; INFILTRATION; PERINEURAL
Status: COMPLETED | OUTPATIENT
Start: 2024-09-13 | End: 2024-09-13

## 2024-10-07 ENCOUNTER — EVALUATION (OUTPATIENT)
Dept: OCCUPATIONAL THERAPY | Facility: CLINIC | Age: 59
End: 2024-10-07
Payer: COMMERCIAL

## 2024-10-07 DIAGNOSIS — M18.0 ARTHRITIS OF CARPOMETACARPAL (CMC) JOINT OF BOTH THUMBS: ICD-10-CM

## 2024-10-07 DIAGNOSIS — M65.331 TRIGGER MIDDLE FINGER OF RIGHT HAND: ICD-10-CM

## 2024-10-07 PROCEDURE — 97110 THERAPEUTIC EXERCISES: CPT | Performed by: OCCUPATIONAL THERAPIST

## 2024-10-07 PROCEDURE — 97165 OT EVAL LOW COMPLEX 30 MIN: CPT | Performed by: OCCUPATIONAL THERAPIST

## 2024-10-07 NOTE — PROGRESS NOTES
OT Evaluation     Today's date: 10/7/2024  Patient name: Adrienne Mendes  : 1965  MRN: [id number]  Referring provider: Michael Moeller MD  Dx:   Encounter Diagnosis     ICD-10-CM    1. Arthritis of carpometacarpal (CMC) joint of both thumbs  M18.0 Ambulatory Referral to PT/OT Hand Therapy      2. Trigger middle finger of right hand  M65.331 Ambulatory Referral to PT/OT Hand Therapy                     Assessment  Impairments: activity intolerance, impaired physical strength, lacks appropriate home exercise program and pain with function  Symptom irritability: moderate    Assessment details: Adrienne is a 59 y/o RHD female presenting with bilateral hand weakness and pain.  She presents with bilateral thumb CMC OA, right trigger thumb, bilateral trigger fingers of middle and ring, and positive provocative testing for CTS on the right.  Her symptoms began about 5 years ago.  She has attended therapy previously in [place] and may wear thumb splints during day prn and night wrist splint prn.  She received cortisone injection for the trigger fingers at her last physician appointment.  Her left thumb locked during IE today.  Pain at rest of the left is minimal, right is mild but constant.  Pain increases with activity, especially holding positions like driving, many cooking and cleaning tasks, and lifting.  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  Evaluation is of low complexity, due to minimal comorbidities and stable clinical presentation.       Pt demonstrates good tolerance of therapy today and was provided with a written HEP focusing on thumb CMC stabilization, thumb taping, night and day splinting, and pain reductions techniques.   and was instructed to perform exercises daily.  The patient demonstrates HEP instructions appropriately, verbalizes understanding, and is in agreement with the written HEP.  Pt will benefit from skilled OT  intervention to assist in pain reduction, resolve numbness, reduce digital triggering,  and allow return to PLOF.         Understanding of Dx/Px/POC: excellent     Prognosis: good    Goals      Goals  STGs (4 weeks)  Patient will be independent in HEP of ROM,  edema management  Patient will report an average pain level of 2/10  Patient will demonstrate independence in use of splints/casts to resolve paresthesias during HOS.    LTGs (12 weeks)  Patient will demonstrate independence in a HEP to maintain ROM, strength, and function at discharge  Patient will report an average pain level of 1-2/10 to be independent in daily tasks  Patient will demonstrate MARIE of the digits to WFL to be independent in self care tasks  Patient will demonstrate AROM of the wrist and forearm WFL to be independent in self care tasks  Patient will demonstrate 5/5 muscle strength in the wrist and forearm to be MI for meal prep  Patient will demonstrate hand strength bilaterally to at least 30 lbs to be MI for IADL tasks    Plan  Patient would benefit from: skilled occupational therapy  Planned modality interventions: thermotherapy: hydrocollator packs    Planned therapy interventions: IASTM, joint mobilization, kinesiology taping, manual therapy, massage, activity modification, orthotic fitting/training, orthotic management and training, patient/caregiver education, strengthening, stretching, therapeutic activities, therapeutic exercise, home exercise program, graded exercise, graded activity and functional ROM exercises    Plan of Care beginning date: 10/7/2024  Plan of Care expiration date: 11/18/2024  Treatment plan discussed with: patient        Subjective Evaluation    History of Present Illness  Mechanism of injury: Chronic bilateral thumb pain, digital triggering, and paresthesias.           Recurrent probem    Quality of life: good    Patient Goals  Patient goals for therapy: decreased pain, independence with ADLs/IADLs and increased  "strength    Pain  Pain scale: L 0/10;  R  5/10.  Quality: discomfort, tight and dull ache  Relieving factors: heat, support and medications  Aggravating factors: lifting (gripping)  Progression: worsening    Social Support  Lives with: adult children    Employment status: not working  Hand dominance: right      Diagnostic Tests  Ultrasound findings: abnormalTreatments  Previous treatment: immobilization and injection treatment  Current treatment: occupational therapy        Objective     Observations     Left Wrist/Hand   Negative for deformity and edema.     Right Wrist/Hand   Negative for deformity and edema.     Additional Observation Details  Thumb OA CMC    Tenderness     Left Wrist/Hand   Tenderness in the CMC joint.     Right Wrist/Hand   Tenderness in the CMC joint.     Neurological Testing     Sensation     Wrist/Hand   Left   Intact: light touch    Right   Intact: light touch  Paresthesia: light touch    Active Range of Motion     Left Wrist   Normal active range of motion    Right Wrist   Normal active range of motion    Left Thumb     Opposition: WNL    Right Thumb   Opposition: WNL but locks frequently    Strength/Myotome Testing     Additional Strength Details  Myles #2  L  36.5 lbs,  R  37.2 lbs    Lat pinch  L  9 lbs  R  8 lbs    3 point pinch  L  12 lbs  R  9 lbs     Tests     Left Wrist/Hand   Positive CMC shoulder sign.     Right Wrist/Hand   Positive CMC shoulder sign and Phalen's sign.              Precautions:  universal      Access Code: [email or web address]  URL: [email or web address]  Date: 10/07/2024  Prepared by: Hayley Barrientos    Exercises  - Thumb Stablization Adductor Release  - 1 x daily - 7 x weekly - 3 sets - 10 reps  - Thumb Stabilization: \"C\" Position Isometric Around Ball  - 1 x daily - 7 x weekly - 3 sets - 10 reps  - Thumb Stabilization: Isometric Opposition With Bunny Ears  - 1 x daily - 7 x weekly - 3 sets - 10 reps  - Thumb Stabilization: Isotonic First Dorsal Interosseous, " Holding A Ball  - 1 x daily - 7 x weekly - 3 sets - 10 reps  - Thumb CMC : Elastic Taping Sling Style  - 1 x daily - 7 x weekly - 3 sets - 10 reps  - Thumb CMC: Elastic Taping (Fredy BellJuliana style)  - 1 x daily - 7 x weekly - 3 sets - 10 reps    POC expires Unit limit Auth Expiration date PT/OT/ST + Visit Limit?   11/18 4 yes BOMN         Dx B/L CMC CTR     Dr Melissa HOLBROOK @      CP 0      Date 10/7            Visit 1            Manuals                                                                 Neuro Re-Ed                                                                                                        Ther Ex             HEP CMC stab; k taping joint protect;  splinting                                                                                                       Ther Activity                                       Gait Training                                       Modalities

## 2024-10-15 ENCOUNTER — OFFICE VISIT (OUTPATIENT)
Dept: OCCUPATIONAL THERAPY | Facility: CLINIC | Age: 59
End: 2024-10-15
Payer: COMMERCIAL

## 2024-10-15 DIAGNOSIS — M65.331 TRIGGER MIDDLE FINGER OF RIGHT HAND: ICD-10-CM

## 2024-10-15 DIAGNOSIS — M18.0 ARTHRITIS OF CARPOMETACARPAL (CMC) JOINT OF BOTH THUMBS: Primary | ICD-10-CM

## 2024-10-15 PROCEDURE — 97140 MANUAL THERAPY 1/> REGIONS: CPT | Performed by: OCCUPATIONAL THERAPIST

## 2024-10-15 PROCEDURE — 97760 ORTHOTIC MGMT&TRAING 1ST ENC: CPT | Performed by: OCCUPATIONAL THERAPIST

## 2024-10-15 NOTE — PROGRESS NOTES
"Daily Note     Today's date: 10/15/2024  Patient name: Adrienne Mendes  : 1965  MRN: [id number]  Referring provider: Michael Moeller MD  Dx:   Encounter Diagnosis     ICD-10-CM    1. Arthritis of carpometacarpal (CMC) joint of both thumbs  M18.0       2. Trigger middle finger of right hand  M65.331                      Subjective: The tape helped and so did the thumb splint (R Thumb IP)      Objective: See treatment diary below      Assessment: Tolerated treatment well. Patient would benefit from continued OT;  Excellent compliance to HEP of splinting and pain reduction.        Plan: Continue per plan of care.  Progress treatment as tolerated.   Right thumb splint rose'd in thermoplastic,  orficast for R III PIP, oval 8 size 8 for R IV;  Left thumb orficast rose'd for IP joint for HOS.  B/L OTC splint wear for HOS with left, long thumb spica,and right wrist splint, no thumb.  A soft wrist wrap may also be worn prn on left thumb during day.  Kinesiotape applied to bilateral thumb CMC end of session, no strip for DeQuervains.      Precautions:  universal      Access Code: [email or web address]  URL: [email or web address]  Date: 10/07/2024  Prepared by: Hayley Barrientos    Exercises  - Thumb Stablization Adductor Release  - 1 x daily - 7 x weekly - 3 sets - 10 reps  - Thumb Stabilization: \"C\" Position Isometric Around Ball  - 1 x daily - 7 x weekly - 3 sets - 10 reps  - Thumb Stabilization: Isometric Opposition With Bunny Ears  - 1 x daily - 7 x weekly - 3 sets - 10 reps  - Thumb Stabilization: Isotonic First Dorsal Interosseous, Holding A Ball  - 1 x daily - 7 x weekly - 3 sets - 10 reps  - Thumb CMC : Elastic Taping Sling Style  - 1 x daily - 7 x weekly - 3 sets - 10 reps  - Thumb CMC: Elastic Taping (Fredy Juliana style)  - 1 x daily - 7 x weekly - 3 sets - 10 reps    POC expires Unit limit Auth Expiration date PT/OT/ST + Visit Limit?    4 yes BOMN         Dx B/L CMC CTR     Dr Melissa HOLBROOK @      CP " 0      Date 10/7 10/15           Visit 1 2           Manuals  23'           IASTM  B/L TGF III, IV; thumbs           STM  Volar MP's B/L  Mass; vibration           Kinesiotape  B/L thumbs                        Neuro Re-Ed                                       Ortho F and T  R Thumb IP, III PIP; IV oval 8-8;  Left thumb IP  15'                                                               Ther Ex             HEP CMC stab; k taping joint protect;  splinting                                                                                                       Ther Activity                                       Gait Training                                       Modalities             MHP  Pre tx

## 2024-10-22 ENCOUNTER — OFFICE VISIT (OUTPATIENT)
Dept: OCCUPATIONAL THERAPY | Facility: CLINIC | Age: 59
End: 2024-10-22
Payer: COMMERCIAL

## 2024-10-22 DIAGNOSIS — M65.331 TRIGGER MIDDLE FINGER OF RIGHT HAND: ICD-10-CM

## 2024-10-22 DIAGNOSIS — M18.0 ARTHRITIS OF CARPOMETACARPAL (CMC) JOINT OF BOTH THUMBS: Primary | ICD-10-CM

## 2024-10-22 PROCEDURE — 97760 ORTHOTIC MGMT&TRAING 1ST ENC: CPT | Performed by: OCCUPATIONAL THERAPIST

## 2024-10-22 PROCEDURE — 97140 MANUAL THERAPY 1/> REGIONS: CPT | Performed by: OCCUPATIONAL THERAPIST

## 2024-10-22 NOTE — PROGRESS NOTES
"Daily Note     Today's date: 10/22/2024  Patient name: Adrienne Mendes  : 1965  MRN: [id number]  Referring provider: Michael Moeller MD  Dx:   No diagnosis found.                 Subjective: The tape helped and so did the thumb splint (R Thumb IP).  I am feeling better.        Objective: See treatment diary below      Assessment: Tolerated treatment well. Patient would benefit from continued OT;  Excellent compliance to HEP of splinting and pain reduction.        Plan: Continue per plan of care.  Progress treatment as tolerated.   Right thumb splint rose'd in thermoplastic,  orficast for R III PIP, oval 8 size 8 for R IV;  Left thumb orficast rose'd for IP joint for HOS.  B/L OTC splint wear for HOS with left, long thumb spica,and right wrist splint, no thumb.  A soft wrist wrap may also be worn prn on left thumb during day.  Kinesiotape applied to bilateral thumb CMC end of session, no strip for DeQuervains.      Precautions:  universal      Access Code: [email or web address]  URL: [email or web address]  Date: 10/07/2024  Prepared by: Hayley Barrientos    Exercises  - Thumb Stablization Adductor Release  - 1 x daily - 7 x weekly - 3 sets - 10 reps  - Thumb Stabilization: \"C\" Position Isometric Around Ball  - 1 x daily - 7 x weekly - 3 sets - 10 reps  - Thumb Stabilization: Isometric Opposition With Bunny Ears  - 1 x daily - 7 x weekly - 3 sets - 10 reps  - Thumb Stabilization: Isotonic First Dorsal Interosseous, Holding A Ball  - 1 x daily - 7 x weekly - 3 sets - 10 reps  - Thumb CMC : Elastic Taping Sling Style  - 1 x daily - 7 x weekly - 3 sets - 10 reps  - Thumb CMC: Elastic Taping (Fredy Juliana style)  - 1 x daily - 7 x weekly - 3 sets - 10 reps    POC expires Unit limit Auth Expiration date PT/OT/ST + Visit Limit?    4 yes BOMN         Dx B/L CMC CTR     Dr Melissa HOLBROOK @ IE     CP 0      Date 10/7 10/15 10/22          Visit 1 2 3          Manuals  23' 25'          IASTM  B/L TGF III, IV; thumbs " B/L TGF III, IV; thumbs          STM  Volar MP's B/L  Mass; vibration Volar MP's B/L  Mass; vibration          Kinesiotape  B/L thumbs B/L thumbs                       Neuro Re-Ed                                       Ortho F and T  R Thumb IP, III PIP; IV oval 8-8;  Left thumb IP  15' Replaced thumb IP splints with thermo  Plastic.   10'                                                              Ther Ex     8'          HEP CMC stab; k taping joint protect;  splinting  Cont night splint, taping, IP thumb splinting.           PROM 1:1   IP flexion, all B/L                                                                                        Ther Activity                                       Gait Training                                       Modalities             MHP  Pre tx Pre tx

## 2024-10-29 ENCOUNTER — OFFICE VISIT (OUTPATIENT)
Dept: OCCUPATIONAL THERAPY | Facility: CLINIC | Age: 59
End: 2024-10-29
Payer: COMMERCIAL

## 2024-10-29 DIAGNOSIS — M18.0 ARTHRITIS OF CARPOMETACARPAL (CMC) JOINT OF BOTH THUMBS: Primary | ICD-10-CM

## 2024-10-29 DIAGNOSIS — M65.331 TRIGGER MIDDLE FINGER OF RIGHT HAND: ICD-10-CM

## 2024-10-29 PROCEDURE — 97110 THERAPEUTIC EXERCISES: CPT | Performed by: OCCUPATIONAL THERAPIST

## 2024-10-29 PROCEDURE — 97140 MANUAL THERAPY 1/> REGIONS: CPT | Performed by: OCCUPATIONAL THERAPIST

## 2024-10-29 NOTE — PROGRESS NOTES
"Daily Note     Today's date: 10/29/2024  Patient name: Adrienne Mendes  : 1965  MRN: [id number]  Referring provider: Michael Moeller MD  Dx:   Encounter Diagnosis     ICD-10-CM    1. Arthritis of carpometacarpal (CMC) joint of both thumbs  M18.0       2. Trigger middle finger of right hand  M65.331                        Subjective: My thumbs are more painful.  My swelling increased and I couldn't wear the splints.      Objective: See treatment diary below      Assessment: Tolerated treatment well. Patient would benefit from continued OT;  Excellent compliance to HEP of splinting and pain reduction.  Pain increased in both thumbs from IP to CMC.       Plan: Continue per plan of care.  Progress treatment as tolerated.    Continue  splinting and kinesiotaping for pain control.  Modify daily tasks to reduce inflammatory response.  NV FOTO    Precautions:  universal      Access Code: [email or web address]  URL: [email or web address]  Date: 10/07/2024  Prepared by: Hayley Barrientos    Exercises  - Thumb Stablization Adductor Release  - 1 x daily - 7 x weekly - 3 sets - 10 reps  - Thumb Stabilization: \"C\" Position Isometric Around Ball  - 1 x daily - 7 x weekly - 3 sets - 10 reps  - Thumb Stabilization: Isometric Opposition With Bunny Ears  - 1 x daily - 7 x weekly - 3 sets - 10 reps  - Thumb Stabilization: Isotonic First Dorsal Interosseous, Holding A Ball  - 1 x daily - 7 x weekly - 3 sets - 10 reps  - Thumb CMC : Elastic Taping Sling Style  - 1 x daily - 7 x weekly - 3 sets - 10 reps  - Thumb CMC: Elastic Taping (Fredy Juliana style)  - 1 x daily - 7 x weekly - 3 sets - 10 reps    POC expires Unit limit Auth Expiration date PT/OT/ST + Visit Limit?    4 yes BOMN         Dx B/L CMC CTR     Dr Melissa HOLBROOK @ IE     CP 0      Date 10/7 10/15 10/22 10/29 F NV    Visit 1 2 3 4     Manuals  23' 25' 25     IASTM  B/L TGF III, IV; thumbs B/L TGF III, IV; thumbs B/L TGF III, IV; thumbs     STM  Volar MP's " B/L  Mass; vibration Volar MP's B/L  Mass; vibration Volar MP's B/L  Mass; vibration     Kinesiotape  B/L thumbs B/L thumbs B/L thumbs  CMC with base support              Neuro Re-Ed                           Ortho F and T  R Thumb IP, III PIP; IV oval 8-8;  Left thumb IP  15' Replaced thumb IP splints with thermo  Plastic.   10'                                          Ther Ex     8'   13     HEP CMC stab; k taping joint protect;  splinting  Cont night splint, taping, IP thumb splinting.  Adjusted thumb splints due to edema variation     PROM 1:1   IP flexion, all B/L IP flexion, all B/L     CMC isotonic    CMC stabilize  B/L                                                  Ther Activity                           Gait Training                           Modalities         MHP  Pre tx Pre tx Pre tx

## 2024-11-05 ENCOUNTER — OFFICE VISIT (OUTPATIENT)
Dept: OCCUPATIONAL THERAPY | Facility: CLINIC | Age: 59
End: 2024-11-05
Payer: COMMERCIAL

## 2024-11-05 DIAGNOSIS — M18.0 ARTHRITIS OF CARPOMETACARPAL (CMC) JOINT OF BOTH THUMBS: Primary | ICD-10-CM

## 2024-11-05 DIAGNOSIS — M65.331 TRIGGER MIDDLE FINGER OF RIGHT HAND: ICD-10-CM

## 2024-11-05 PROCEDURE — 97140 MANUAL THERAPY 1/> REGIONS: CPT | Performed by: OCCUPATIONAL THERAPIST

## 2024-11-05 PROCEDURE — 97110 THERAPEUTIC EXERCISES: CPT | Performed by: OCCUPATIONAL THERAPIST

## 2024-11-05 NOTE — PROGRESS NOTES
"Daily Note     Today's date: 2024  Patient name: Adrienne Mendes  : 1965  MRN: [id number]  Referring provider: Michael Moeller MD  Dx:   Encounter Diagnosis     ICD-10-CM    1. Arthritis of carpometacarpal (CMC) joint of both thumbs  M18.0       2. Trigger middle finger of right hand  M65.331                        Subjective: My thumbs are more painful.  My swelling increased and I couldn't wear the splints.      Objective: See treatment diary below      Assessment: Tolerated treatment well. Patient would benefit from continued OT;  Excellent compliance to HEP of splinting and pain reduction.  Pain increased in both thumbs from IP to CMC.       Plan: Continue per plan of care.  Progress treatment as tolerated.    Continue / splinting and kinesiotaping for pain control.  Modify daily tasks to reduce inflammatory response.  NV is last scheduled appt.  Repeat measurements and FOTO.     Precautions:  universal      Access Code: [email or web address]  URL: [email or web address]  Date: 10/07/2024  Prepared by: Hayley Barrientos    Exercises  - Thumb Stablization Adductor Release  - 1 x daily - 7 x weekly - 3 sets - 10 reps  - Thumb Stabilization: \"C\" Position Isometric Around Ball  - 1 x daily - 7 x weekly - 3 sets - 10 reps  - Thumb Stabilization: Isometric Opposition With Bunny Ears  - 1 x daily - 7 x weekly - 3 sets - 10 reps  - Thumb Stabilization: Isotonic First Dorsal Interosseous, Holding A Ball  - 1 x daily - 7 x weekly - 3 sets - 10 reps  - Thumb CMC : Elastic Taping Sling Style  - 1 x daily - 7 x weekly - 3 sets - 10 reps  - Thumb CMC: Elastic Taping (Fredy Juliana style)  - 1 x daily - 7 x weekly - 3 sets - 10 reps    POC expires Unit limit Auth Expiration date PT/OT/ST + Visit Limit?    4 yes BOMN         Dx B/L CMC CTR     Dr Torres     FOTO @ IE     CP 0      Date 10/7 10/15 10/22 10/29 11/5    Visit 1 2 3 4 5    Manuals  23' 25' 25 25    IASTM  B/L TGF III, IV; thumbs B/L TGF III, IV; " thumbs B/L TGF III, IV; thumbs B/L TGF III, IV; thumbs    STM  Volar MP's B/L  Mass; vibration Volar MP's B/L  Mass; vibration Volar MP's B/L  Mass; vibration Volar MP's B/L  Mass; vibration    Kinesiotape  B/L thumbs B/L thumbs B/L thumbs  CMC with base support B/L thumbs  CMC with base support             Neuro Re-Ed                           Ortho F and T  R Thumb IP, III PIP; IV oval 8-8;  Left thumb IP  15' Replaced thumb IP splints with thermo  Plastic.   10'                                          Ther Ex     8'   13  15'    HEP CMC stab; k taping joint protect;  splinting  Cont night splint, taping, IP thumb splinting.  Adjusted thumb splints due to edema variation Pt ed to self apply taping to bilateral CMC thumbs    PROM 1:1   IP flexion, all B/L IP flexion, all B/L IP flexion, all B/L    CMC isotonic    CMC stabilize  B/L                                                  Ther Activity                           Gait Training                           Modalities         MHP  Pre tx Pre tx Pre tx Pre tx

## 2024-11-12 ENCOUNTER — OFFICE VISIT (OUTPATIENT)
Dept: OCCUPATIONAL THERAPY | Facility: CLINIC | Age: 59
End: 2024-11-12
Payer: COMMERCIAL

## 2024-11-12 DIAGNOSIS — M18.0 ARTHRITIS OF CARPOMETACARPAL (CMC) JOINT OF BOTH THUMBS: Primary | ICD-10-CM

## 2024-11-12 DIAGNOSIS — M65.331 TRIGGER MIDDLE FINGER OF RIGHT HAND: ICD-10-CM

## 2024-11-12 PROCEDURE — 97140 MANUAL THERAPY 1/> REGIONS: CPT | Performed by: OCCUPATIONAL THERAPIST

## 2024-11-12 PROCEDURE — 97110 THERAPEUTIC EXERCISES: CPT | Performed by: OCCUPATIONAL THERAPIST

## 2024-11-12 PROCEDURE — 97168 OT RE-EVAL EST PLAN CARE: CPT | Performed by: OCCUPATIONAL THERAPIST

## 2024-11-12 NOTE — PROGRESS NOTES
OT Re-Evaluation  and OT Discharge     Today's date: 2024  Patient name: Adrienne Mendes  : 1965  MRN: [id number]  Referring provider: Michael Moeller MD  Dx:   Encounter Diagnosis     ICD-10-CM    1. Arthritis of carpometacarpal (CMC) joint of both thumbs  M18.0       2. Trigger middle finger of right hand  M65.331                      Assessment  Symptom irritability: low    Assessment details: Adrienne is a 57 y/o RHD female presenting with bilateral hand weakness and pain.  She presents with bilateral thumb CMC OA, right trigger thumb, bilateral trigger fingers of middle and ring, and positive provocative testing for CTS on the right.  Her symptoms began about 5 years ago.  She has attended therapy previously in [place] and may wear thumb splints during day prn and night wrist splint prn.  She received cortisone injection for the trigger fingers at her last physician appointment.  Her left thumb locked during IE today.  Pain at rest of the left is minimal, right is mild but constant.  Pain increases with activity, especially holding positions like driving, many cooking and cleaning tasks, and lifting.  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  Evaluation is of low complexity, due to minimal comorbidities and stable clinical presentation.       Pt demonstrates good tolerance of therapy today and was provided with a written HEP focusing on thumb CMC stabilization, thumb taping, night and day splinting, and pain reductions techniques.   and was instructed to perform exercises daily.  The patient demonstrates HEP instructions appropriately, verbalizes understanding, and is in agreement with the written HEP.  Pt will benefit from skilled OT intervention to assist in pain reduction, resolve numbness, reduce digital triggering,  and allow return to PLOF.      Discharge Summary/ RE 24  Adrienne has been seen for 6 OT visits and has  made excellent progress in managing pain and supporting arthritic and triggering thumbs.  She presents with decreased pain  with function and controlled triggering of the thumbs with ADLs.  She is independent in her HEP for daily splinting, joint protection, and kinesiotaping.  The patient  has achieved maximum benefit from OT.   Recommend discharge from OT. The patient is in agreement with discharge plan        Understanding of Dx/Px/POC: excellent     Prognosis: good    Goals      Goals  STGs (4 weeks)  Patient will be independent in HEP of ROM,  edema management MET  Patient will report an average pain level of 2/10 MET at rest  Patient will demonstrate independence in use of splints/casts to resolve paresthesias during HOS.  MET  LTGs (12 weeks)  Patient will demonstrate independence in a HEP to maintain ROM, strength, and function at discharge MET  Patient will report an average pain level of 1-2/10 to be independent in daily tasks PROGRESSING  Patient will demonstrate MARIE of the digits to WFL to be independent in self care tasks MET  Patient will demonstrate AROM of the wrist and forearm WFL to be independent in self care tasks MET  Patient will demonstrate 5/5 muscle strength in the wrist and forearm to be MI for meal prep MET  Patient will demonstrate hand strength bilaterally to at least 30 lbs to be MI for IADL tasks MET    Plan  Therapy options: Discharge to HEP.    Planned therapy interventions: home exercise program    Plan of Care beginning date: 10/7/2024  Plan of Care expiration date: 11/18/2024  Treatment plan discussed with: patient      Subjective Evaluation    History of Present Illness  Mechanism of injury: Chronic bilateral thumb pain, digital triggering, and paresthesias.           Recurrent probem    Quality of life: good    Patient Goals  Patient goals for therapy: decreased pain, independence with ADLs/IADLs and increased strength    Pain  Pain scale: L 0/10;  R  5/10.  Quality:  discomfort  Relieving factors: heat, support and medications  Aggravating factors: lifting (gripping)  Progression: improved    Social Support  Lives with: adult children    Employment status: not working  Hand dominance: right      Diagnostic Tests  Ultrasound findings: abnormalTreatments  Previous treatment: immobilization and injection treatment  Current treatment: occupational therapy      Objective     Observations     Left Wrist/Hand   Negative for deformity and edema.     Right Wrist/Hand   Negative for deformity and edema.     Additional Observation Details  Thumb OA CMC    Tenderness     Left Wrist/Hand   Tenderness in the CMC joint.     Right Wrist/Hand   Tenderness in the CMC joint.     Neurological Testing     Sensation     Wrist/Hand   Left   Intact: light touch    Right   Intact: light touch    Active Range of Motion     Left Wrist   Normal active range of motion    Right Wrist   Normal active range of motion    Left Thumb     Opposition: WNL    Right Thumb   Opposition: WNL but locks frequently    Strength/Myotome Testing     Additional Strength Details  Myles #2  L  57 lbs,  R  46.5 lbs    Lat pinch  L  9 lbs  R  8 lbs    3 point pinch  L  12 lbs  R  9 lbs     Tests     Left Wrist/Hand   Positive CMC shoulder sign.     Right Wrist/Hand   Positive CMC shoulder sign.   Negative Phalen's sign.         Daily Note     Today's date: 2024  Patient name: Adrienne Mendes  : 1965  MRN: [id number]  Referring provider: Michael Moeller MD  Dx:   Encounter Diagnosis     ICD-10-CM    1. Arthritis of carpometacarpal (CMC) joint of both thumbs  M18.0       2. Trigger middle finger of right hand  M65.331                        Subjective: My thumbs are painful but I can control the pain with the splints and taping.      Objective: See treatment diary below      Assessment: All therapy goals met except pain level.    Excellent compliance to HEP of splinting and pain reduction.        Plan: Discharge  "to HEP with focus on pain control with splinting and taping bilaterally.  Pt will f/u with physician for continued care of chronic thumb pain.  Modify daily tasks to reduce inflammatory response.      Precautions:  universal      Access Code: [email or web address]  URL: [email or web address]  Date: 10/07/2024  Prepared by: Hayley Barrientos    Exercises  - Thumb Stablization Adductor Release  - 1 x daily - 7 x weekly - 3 sets - 10 reps  - Thumb Stabilization: \"C\" Position Isometric Around Ball  - 1 x daily - 7 x weekly - 3 sets - 10 reps  - Thumb Stabilization: Isometric Opposition With Bunny Ears  - 1 x daily - 7 x weekly - 3 sets - 10 reps  - Thumb Stabilization: Isotonic First Dorsal Interosseous, Holding A Ball  - 1 x daily - 7 x weekly - 3 sets - 10 reps  - Thumb CMC : Elastic Taping Sling Style  - 1 x daily - 7 x weekly - 3 sets - 10 reps  - Thumb CMC: Elastic Taping (Fredy Juliana style)  - 1 x daily - 7 x weekly - 3 sets - 10 reps    POC expires Unit limit Auth Expiration date PT/OT/ST + Visit Limit?   11/18 4 yes BOMN         Dx B/L CMC CTR     Dr Melissa HOLBROOK @ [facility]     CP 0      Date 10/7 10/15 10/22 10/29 11/5 11/12  RE/DC   Visit 1 2 3 4 5 6   Manuals  23' 25' 25 25 25   IASTM  B/L TGF III, IV; thumbs B/L TGF III, IV; thumbs B/L TGF III, IV; thumbs B/L TGF III, IV; thumbs B/L TGF III, IV; thumbs   STM  Volar MP's B/L  Mass; vibration Volar MP's B/L  Mass; vibration Volar MP's B/L  Mass; vibration Volar MP's B/L  Mass; vibration Volar MP's B/L  Mass; vibration   Kinesiotape  B/L thumbs B/L thumbs B/L thumbs  CMC with base support B/L thumbs  CMC with base support B/L thumbs  CMC with base support            Neuro Re-Ed                           Ortho F and T  R Thumb IP, III PIP; IV oval 8-8;  Left thumb IP  15' Replaced thumb IP splints with thermo  Plastic.   10'                                          Ther Ex     8'   13  15'   15   HEP CMC stab; k taping joint protect;  splinting  Cont night splint, " taping, IP thumb splinting.  Adjusted thumb splints due to edema variation Pt ed to self apply taping to bilateral CMC thumbs Review all HEP for D/C. Reviewed  self taping to bilateral CMC thumbs   PROM 1:1   IP flexion, all B/L IP flexion, all B/L IP flexion, all B/L IP flexion, all B/L   CMC isotonic    CMC stabilize  B/L                                                  Ther Activity                           Gait Training                           Modalities         MHP  Pre tx Pre tx Pre tx Pre tx Pre tx

## 2024-12-03 ENCOUNTER — TELEPHONE (OUTPATIENT)
Age: 59
End: 2024-12-03

## 2024-12-03 NOTE — TELEPHONE ENCOUNTER
Called Pt @12:55 on 12/03/2024 offering options for either the injection or surgery will decide before upcoming appointment

## 2024-12-03 NOTE — TELEPHONE ENCOUNTER
Caller: Patient     Doctor: Sajan     Reason for call: Patient was told by OT that she has trigger finger and asked about the procedure that is done in office?     Call back#: [phone]

## 2024-12-13 ENCOUNTER — OFFICE VISIT (OUTPATIENT)
Dept: OBGYN CLINIC | Facility: CLINIC | Age: 59
End: 2024-12-13
Payer: COMMERCIAL

## 2024-12-13 VITALS — BODY MASS INDEX: 29.64 KG/M2 | WEIGHT: 157 LBS | HEIGHT: 61 IN

## 2024-12-13 DIAGNOSIS — M65.312 TRIGGER THUMB OF LEFT HAND: ICD-10-CM

## 2024-12-13 DIAGNOSIS — M65.311 TRIGGER THUMB OF RIGHT HAND: Primary | ICD-10-CM

## 2024-12-13 DIAGNOSIS — M18.0 ARTHRITIS OF CARPOMETACARPAL (CMC) JOINT OF BOTH THUMBS: ICD-10-CM

## 2024-12-13 PROCEDURE — 20550 NJX 1 TENDON SHEATH/LIGAMENT: CPT | Performed by: STUDENT IN AN ORGANIZED HEALTH CARE EDUCATION/TRAINING PROGRAM

## 2024-12-13 PROCEDURE — 20600 DRAIN/INJ JOINT/BURSA W/O US: CPT | Performed by: STUDENT IN AN ORGANIZED HEALTH CARE EDUCATION/TRAINING PROGRAM

## 2024-12-13 PROCEDURE — 99214 OFFICE O/P EST MOD 30 MIN: CPT | Performed by: STUDENT IN AN ORGANIZED HEALTH CARE EDUCATION/TRAINING PROGRAM

## 2024-12-13 RX ADMIN — ROPIVACAINE HYDROCHLORIDE 1 ML: 2 INJECTION, SOLUTION EPIDURAL; INFILTRATION; PERINEURAL at 12:30

## 2024-12-13 RX ADMIN — ROPIVACAINE HYDROCHLORIDE 1 ML: 5 INJECTION, SOLUTION EPIDURAL; INFILTRATION; PERINEURAL at 12:30

## 2024-12-13 RX ADMIN — BETAMETHASONE SODIUM PHOSPHATE AND BETAMETHASONE ACETATE 6 MG: 3; 3 INJECTION, SUSPENSION INTRA-ARTICULAR; INTRALESIONAL; INTRAMUSCULAR; SOFT TISSUE at 12:30

## 2024-12-13 NOTE — PROGRESS NOTES
ORTHOPAEDIC HAND, WRIST, AND ELBOW OFFICE  VISIT      ASSESSMENT/PLAN:      Diagnoses and all orders for this visit:    Trigger thumb of right hand  -     Hand/upper extremity injection: R thumb A1  -     ropivacaine (NAROPIN) 0.5 % injection 1 mL  -     betamethasone acetate-betamethasone sodium phosphate (CELESTONE) injection 6 mg    Trigger thumb of left hand  -     Hand/upper extremity injection: L thumb A1  -     ropivacaine (NAROPIN) 0.5 % injection 1 mL  -     betamethasone acetate-betamethasone sodium phosphate (CELESTONE) injection 6 mg    Arthritis of carpometacarpal (CMC) joint of both thumbs  -     Small joint arthrocentesis: R thumb CMC  -     Small joint arthrocentesis: L thumb CMC  -     ropivacaine (NAROPIN) injection 1 mL  -     ropivacaine (NAROPIN) injection 1 mL  -     betamethasone acetate-betamethasone sodium phosphate (CELESTONE) injection 6 mg  -     betamethasone acetate-betamethasone sodium phosphate (CELESTONE) injection 6 mg          59 y.o. female with bilateral thumb trigger finger, resolved right long trigger finger, and bilateral thumb CMC OA     Discussed non operative versus surgical intervention for the trigger thumb. Discussed with the patient the injections can be curative. The patient is scheduled for a cruise towards to end of January. Discussed I would recommend she undergo injections and we can see her prior to her cruise and consider repeat injections. The patient was agreeable to this. She consented and underwent bilateral trigger thumb injections in the office today. She also consented and underwent repeat bilateral thumb CMC injections in the office today without any complications.      Follow Up:  6 weeks       To Do Next Visit:  Re-evaluation of current issue        Michael Moeller MD  Attending, Orthopaedic Surgery  Hand, Wrist, and Elbow Surgery  [facility]    ______________________________________________________________________________________________    CHIEF COMPLAINT:  Chief Complaint   Patient presents with   • Left Hand - Follow-up   • Right Hand - Follow-up       SUBJECTIVE:  Patient is a 59 y.o. RHD female who presents today for follow up of bilateral thumb CMC OA and right long trigger finger. The patient underwent injections at her last visit. She has been attending formal therapy. The therapist did fabricate braces for her. She states her thumbs always lock up and its painful. She also states she will drop objects. She states the injections helped with her thumb pain and the right long triggering.      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 No pertinent past medical history.    PAST SURGICAL HISTORY:  Past Surgical History:   Procedure Laterality Date   • PARTIAL HYSTERECTOMY     • THYROIDECTOMY         FAMILY HISTORY:  Family History   Problem Relation Age of Onset   • No Known Problems Mother    • Liver cancer Father        SOCIAL HISTORY:  Social History     Tobacco Use   • Smoking status: Never     Passive exposure: Never   • Smokeless tobacco: Never   Substance Use Topics   • Alcohol use: Not Currently   • Drug use: Never       MEDICATIONS:  No current outpatient medications on file.  No current facility-administered medications for this visit.    ALLERGIES:  Allergies   Allergen Reactions   • Sulfur Rash           REVIEW OF SYSTEMS:  Musculoskeletal:        As noted in HPI.   All other systems reviewed and are negative.    VITALS:  There were no vitals filed for this visit.    LABS:  HgA1c: No results found for: \"HGBA1C\"  BMP:   Lab Results   Component Value Date    CALCIUM 9.4 07/12/2024    K 3.6 07/12/2024    CO2 23 07/12/2024     07/12/2024    BUN 15 07/12/2024    " "CREATININE 0.70 07/12/2024       _____________________________________________________  PHYSICAL EXAMINATION:  General: Well developed and well nourished, alert & oriented x 3, appears comfortable  Psychiatric: Normal  HEENT: Normocephalic, Atraumatic Trachea Midline, No torticollis  Pulmonary: No audible wheezing or respiratory distress   Abdomen/GI: Non tender, non distended   Cardiovascular: No pitting edema, 2+ radial pulse   Skin: No masses, erythema, lacerations, fluctation, ulcerations  Neurovascular: Sensation Intact to the Median, Ulnar, Radial Nerve, Motor Intact to the Median, Ulnar, Radial Nerve, and Pulses Intact  Musculoskeletal: Normal, except as noted in detailed exam and in HPI.      MUSCULOSKELETAL EXAMINATION:  Right hand  + locking thumb  NTTP A1 pulley long finger  No triggering long finger  Positive CMC grind test  TTP CMC joint    Left thumb  + click  No obvious locking  Positive CMC grind test  TTP CMC joint    ___________________________________________________  STUDIES REVIEWED:  No new studies to review         PROCEDURES PERFORMED:  Hand/upper extremity injection: R thumb A1  Universal Protocol:  Consent: Verbal consent obtained.  Risks and benefits: risks, benefits and alternatives were discussed  Consent given by: patient  Time out: Immediately prior to procedure a \"time out\" was called to verify the correct patient, procedure, equipment, support staff and site/side marked as required.  Patient understanding: patient states understanding of the procedure being performed  Site marked: the operative site was marked  Patient identity confirmed: verbally with patient  Supporting Documentation  Indications: tendon swelling   Procedure Details  Condition:trigger finger Location: thumb - R thumb A1   Preparation: Patient was prepped and draped in the usual sterile fashion  Needle size: 25 G  Ultrasound guidance: no  Approach: volar  Medications administered: 6 mg betamethasone " "acetate-betamethasone sodium phosphate 6 (3-3) mg/mL; 1 mL ropivacaine 0.5 %  Patient tolerance: patient tolerated the procedure well with no immediate complications  Dressing:  Sterile dressing applied       Hand/upper extremity injection: L thumb A1  Universal Protocol:  Consent: Verbal consent obtained.  Risks and benefits: risks, benefits and alternatives were discussed  Consent given by: patient  Time out: Immediately prior to procedure a \"time out\" was called to verify the correct patient, procedure, equipment, support staff and site/side marked as required.  Patient understanding: patient states understanding of the procedure being performed  Site marked: the operative site was marked  Patient identity confirmed: verbally with patient  Supporting Documentation  Indications: tendon swelling   Procedure Details  Condition:trigger finger Location: thumb - L thumb A1   Preparation: Patient was prepped and draped in the usual sterile fashion  Needle size: 25 G  Ultrasound guidance: no  Approach: volar  Medications administered: 6 mg betamethasone acetate-betamethasone sodium phosphate 6 (3-3) mg/mL; 1 mL ropivacaine 0.5 %  Patient tolerance: patient tolerated the procedure well with no immediate complications  Dressing:  Sterile dressing applied       Small joint arthrocentesis: R thumb CMC  [place] Protocol:  procedure performed by consultantConsent: Verbal consent obtained.  Consent given by: patient  Patient identity confirmed: verbally with patient  Supporting Documentation  Indications: pain   Procedure Details  Location: thumb - R thumb CMC  Needle size: 25 G  Ultrasound guidance: no  Medications administered: 6 mg betamethasone acetate-betamethasone sodium phosphate 6 (3-3) mg/mL; 1 mL ropivacaine 0.2 %    Patient tolerance: patient tolerated the procedure well with no immediate complications  Dressing:  Sterile dressing applied      Small joint arthrocentesis: L thumb CMC  [place] Protocol:  procedure " performed by consultantConsent: Verbal consent obtained.  Consent given by: patient  Patient identity confirmed: verbally with patient  Supporting Documentation  Indications: pain   Procedure Details  Location: thumb - L thumb CMC  Needle size: 25 G  Ultrasound guidance: no  Medications administered: 1 mL ropivacaine 0.2 %; 6 mg betamethasone acetate-betamethasone sodium phosphate 6 (3-3) mg/mL    Patient tolerance: patient tolerated the procedure well with no immediate complications  Dressing:  Sterile dressing applied             _____________________________________________________      Scribe Attestation    I,:  Tsering Calderon MA am acting as a scribe while in the presence of the attending physician.:       I,:  Michael Moeller MD personally performed the services described in this documentation    as scribed in my presence.:

## 2024-12-14 RX ORDER — ROPIVACAINE HYDROCHLORIDE 5 MG/ML
1 INJECTION, SOLUTION EPIDURAL; INFILTRATION; PERINEURAL
Status: COMPLETED | OUTPATIENT
Start: 2024-12-13 | End: 2024-12-13

## 2024-12-14 RX ORDER — ROPIVACAINE HYDROCHLORIDE 2 MG/ML
1 INJECTION, SOLUTION EPIDURAL; INFILTRATION; PERINEURAL
Status: COMPLETED | OUTPATIENT
Start: 2024-12-13 | End: 2024-12-13

## 2024-12-14 RX ORDER — BETAMETHASONE SODIUM PHOSPHATE AND BETAMETHASONE ACETATE 3; 3 MG/ML; MG/ML
6 INJECTION, SUSPENSION INTRA-ARTICULAR; INTRALESIONAL; INTRAMUSCULAR; SOFT TISSUE
Status: COMPLETED | OUTPATIENT
Start: 2024-12-13 | End: 2024-12-13

## 2025-03-25 ENCOUNTER — OFFICE VISIT (OUTPATIENT)
Age: 60
End: 2025-03-25
Payer: COMMERCIAL

## 2025-03-25 ENCOUNTER — APPOINTMENT (OUTPATIENT)
Age: 60
End: 2025-03-25
Payer: COMMERCIAL

## 2025-03-25 VITALS
DIASTOLIC BLOOD PRESSURE: 72 MMHG | RESPIRATION RATE: 18 BRPM | HEART RATE: 67 BPM | WEIGHT: 155.2 LBS | BODY MASS INDEX: 29.32 KG/M2 | TEMPERATURE: 96.8 F | SYSTOLIC BLOOD PRESSURE: 115 MMHG | OXYGEN SATURATION: 99 %

## 2025-03-25 DIAGNOSIS — M54.6 ACUTE RIGHT-SIDED THORACIC BACK PAIN: ICD-10-CM

## 2025-03-25 DIAGNOSIS — M54.6 ACUTE RIGHT-SIDED THORACIC BACK PAIN: Primary | ICD-10-CM

## 2025-03-25 DIAGNOSIS — S29.011A INTERCOSTAL MUSCLE STRAIN, INITIAL ENCOUNTER: ICD-10-CM

## 2025-03-25 DIAGNOSIS — S23.41XA RIB SPRAIN, INITIAL ENCOUNTER: ICD-10-CM

## 2025-03-25 PROCEDURE — S9088 SERVICES PROVIDED IN URGENT: HCPCS | Performed by: EMERGENCY MEDICINE

## 2025-03-25 PROCEDURE — 99213 OFFICE O/P EST LOW 20 MIN: CPT | Performed by: EMERGENCY MEDICINE

## 2025-03-25 PROCEDURE — 71101 X-RAY EXAM UNILAT RIBS/CHEST: CPT

## 2025-03-25 RX ORDER — IBUPROFEN 600 MG/1
TABLET, FILM COATED ORAL
Qty: 21 TABLET | Refills: 0 | Status: SHIPPED | OUTPATIENT
Start: 2025-03-25

## 2025-03-25 RX ORDER — CYCLOBENZAPRINE HCL 10 MG
10 TABLET ORAL
Qty: 10 TABLET | Refills: 0 | Status: SHIPPED | OUTPATIENT
Start: 2025-03-25 | End: 2025-04-04

## 2025-03-25 NOTE — PROGRESS NOTES
[facility]        NAME: Adrienne Mendes is a 59 y.o. female  : 1965    MRN: [id number]  DATE: 2025  TIME: 1:52 PM    Assessment and Plan   Acute right-sided thoracic back pain [M54.6]  1. Acute right-sided thoracic back pain  XR ribs right w pa chest min 3 views      2. Rib sprain, initial encounter  ibuprofen (MOTRIN) 600 mg tablet    cyclobenzaprine (FLEXERIL) 10 mg tablet      3. Intercostal muscle strain, initial encounter  ibuprofen (MOTRIN) 600 mg tablet    cyclobenzaprine (FLEXERIL) 10 mg tablet      Rib/Chest X-Ray:  No fx/No acute disease/No pneumo      Patient Instructions     Patient Instructions    Ice 20 min at a time 3-4 x/day then heat thereafter  F/u with PCP in 2-3 days  Proceed to the ER if symptoms get worse, shortness of breath, etc.  No heavy lifting x 7 days  5.  Try to take deep breaths  6.  Take Motrin with food  7.  Take Flexeril only at night - no driving with this.      Follow up with PCP in 3-5 days.  Proceed to  ER if symptoms worsen.    Chief Complaint     Chief Complaint   Patient presents with   • Rib Pain     Symptoms started this weekend, pt complains of right sided back pain on lower/mid back, and difficulty breathing. Pt states she injured it lifting up her mother. Complains of pain when lying down or turning.          History of Present Illness       59-year-old female with a chief complaint of right-sided rib pain.  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  Patient states she has had trouble lying down or prone and also with other certain movements.  Patient is also complaining of some shortness of breath.        Review of Systems   Review of Systems   Constitutional:  Negative for diaphoresis, fatigue and fever.   HENT:  Negative for congestion, ear pain, nosebleeds and sore throat.    Eyes:  Negative for photophobia, pain,  discharge and visual disturbance.   Respiratory:  Positive for shortness of breath. Negative for cough, choking, chest tightness and wheezing.    Cardiovascular:  Negative for chest pain and palpitations.   Gastrointestinal:  Negative for abdominal distention, abdominal pain, diarrhea and vomiting.   Genitourinary:  Negative for dysuria, flank pain and frequency.   Musculoskeletal:  Positive for back pain. Negative for gait problem and joint swelling.   Skin:  Negative for color change and rash.   Neurological:  Negative for dizziness, syncope and headaches.   Psychiatric/Behavioral:  Negative for behavioral problems and confusion. The patient is not nervous/anxious.    All other systems reviewed and are negative.        Current Medications       Current Outpatient Medications:   •  cyclobenzaprine (FLEXERIL) 10 mg tablet, Take 1 tablet (10 mg total) by mouth daily at bedtime for 10 days, Disp: 10 tablet, Rfl: 0  •  ibuprofen (MOTRIN) 600 mg tablet, Take one tablet every 6 hours with food for pain x 1 week if needed, Disp: 21 tablet, Rfl: 0    Current Allergies     Allergies as of 03/25/2025 - Reviewed 03/25/2025   Allergen Reaction Noted   • Sulfur Rash 11/01/2022            The following portions of the patient's history were reviewed and updated as appropriate: allergies, current medications, past family history, past medical history, past social history, past surgical history and problem list.     History reviewed. No pertinent past medical history.    Past Surgical History:   Procedure Laterality Date   • PARTIAL HYSTERECTOMY     • THYROIDECTOMY         Family History   Problem Relation Age of Onset   • No Known Problems Mother    • Liver cancer Father          Medications have been verified.        Objective   /72 (BP Location: Left arm, Patient Position: Sitting, Cuff Size: Standard)   Pulse 67   Temp (!) 96.8 °F (36 °C) (Tympanic)   Resp 18   Wt 70.4 kg (155 lb 3.2 oz)   SpO2 99%   BMI 29.32  kg/m²        Physical Exam     Physical Exam  Vitals and nursing note reviewed.   Constitutional:       Appearance: Normal appearance.      Comments: Extremely pleasant 59-year-old female sitting on the exam table complaining of right-sided mid back pain.   HENT:      Head: Normocephalic and atraumatic.   Eyes:      Extraocular Movements: Extraocular movements intact.      Pupils: Pupils are equal, round, and reactive to light.   Cardiovascular:      Rate and Rhythm: Normal rate and regular rhythm.   Pulmonary:      Effort: Pulmonary effort is normal.      Breath sounds: Normal breath sounds.      Comments: Patient has tenderness on lateral compression of the right ribs, with pain on palpation to the posterior eighth or ninth rib in the mid back.  There is no central spinal tenderness and there is no numbness or tingling.  Chest:      Chest wall: Tenderness present.   Abdominal:      General: Abdomen is flat.      Palpations: Abdomen is soft.   Musculoskeletal:      Cervical back: Normal range of motion.   Skin:     General: Skin is warm and dry.   Neurological:      Mental Status: She is alert.   Psychiatric:         Mood and Affect: Mood normal.

## 2025-03-25 NOTE — PATIENT INSTRUCTIONS
Ice 20 min at a time 3-4 x/day then heat thereafter  F/u with PCP in 2-3 days  Proceed to the ER if symptoms get worse, shortness of breath, etc.  No heavy lifting x 7 days  5.  Try to take deep breaths  6.  Take Motrin with food  7.  Take Flexeril only at night - no driving with this.

## 2025-04-14 ENCOUNTER — APPOINTMENT (EMERGENCY)
Dept: CT IMAGING | Facility: HOSPITAL | Age: 60
DRG: 720 | End: 2025-04-14
Payer: COMMERCIAL

## 2025-04-14 ENCOUNTER — HOSPITAL ENCOUNTER (INPATIENT)
Facility: HOSPITAL | Age: 60
LOS: 3 days | Discharge: HOME | DRG: 720 | End: 2025-04-18
Attending: EMERGENCY MEDICINE | Admitting: STUDENT IN AN ORGANIZED HEALTH CARE EDUCATION/TRAINING PROGRAM
Payer: COMMERCIAL

## 2025-04-14 ENCOUNTER — APPOINTMENT (EMERGENCY)
Dept: RADIOLOGY | Facility: HOSPITAL | Age: 60
DRG: 720 | End: 2025-04-14
Payer: COMMERCIAL

## 2025-04-14 DIAGNOSIS — N12 PYELONEPHRITIS: Primary | ICD-10-CM

## 2025-04-14 DIAGNOSIS — R82.994 HYPERCALCIURIA: ICD-10-CM

## 2025-04-14 DIAGNOSIS — N20.0 NEPHROLITHIASIS: ICD-10-CM

## 2025-04-14 PROBLEM — A41.9 SEPSIS WITHOUT ACUTE ORGAN DYSFUNCTION (HCC): Status: ACTIVE | Noted: 2025-04-14

## 2025-04-14 PROBLEM — A41.51 SEPSIS DUE TO ESCHERICHIA COLI WITHOUT ACUTE ORGAN DYSFUNCTION (HCC): Status: RESOLVED | Noted: 2025-04-14 | Resolved: 2025-04-14

## 2025-04-14 PROBLEM — A41.51 SEPSIS DUE TO ESCHERICHIA COLI WITHOUT ACUTE ORGAN DYSFUNCTION (HCC): Status: ACTIVE | Noted: 2025-04-14

## 2025-04-14 LAB
2HR DELTA HS TROPONIN: 1 NG/L
ALBUMIN SERPL BCG-MCNC: 4.2 G/DL (ref 3.5–5)
ALP SERPL-CCNC: 94 U/L (ref 34–104)
ALT SERPL W P-5'-P-CCNC: 10 U/L (ref 7–52)
ANION GAP SERPL CALCULATED.3IONS-SCNC: 10 MMOL/L (ref 4–13)
APTT PPP: 31 SECONDS (ref 23–34)
AST SERPL W P-5'-P-CCNC: 12 U/L (ref 13–39)
BACTERIA UR QL AUTO: ABNORMAL /HPF
BASOPHILS # BLD AUTO: 0.01 THOUSANDS/ÂΜL (ref 0–0.1)
BASOPHILS NFR BLD AUTO: 0 % (ref 0–1)
BILIRUB SERPL-MCNC: 1 MG/DL (ref 0.2–1)
BILIRUB UR QL STRIP: NEGATIVE
BUN SERPL-MCNC: 9 MG/DL (ref 5–25)
CALCIUM SERPL-MCNC: 9.5 MG/DL (ref 8.4–10.2)
CARDIAC TROPONIN I PNL SERPL HS: 4 NG/L (ref ?–50)
CARDIAC TROPONIN I PNL SERPL HS: 5 NG/L (ref ?–50)
CHLORIDE SERPL-SCNC: 100 MMOL/L (ref 96–108)
CLARITY UR: ABNORMAL
CO2 SERPL-SCNC: 25 MMOL/L (ref 21–32)
COLOR UR: YELLOW
CREAT SERPL-MCNC: 0.75 MG/DL (ref 0.6–1.3)
EOSINOPHIL # BLD AUTO: 0.02 THOUSAND/ÂΜL (ref 0–0.61)
EOSINOPHIL NFR BLD AUTO: 0 % (ref 0–6)
ERYTHROCYTE [DISTWIDTH] IN BLOOD BY AUTOMATED COUNT: 11.7 % (ref 11.6–15.1)
FLUAV RNA RESP QL NAA+PROBE: NEGATIVE
FLUBV RNA RESP QL NAA+PROBE: NEGATIVE
GFR SERPL CREATININE-BSD FRML MDRD: 87 ML/MIN/1.73SQ M
GLUCOSE SERPL-MCNC: 111 MG/DL (ref 65–140)
GLUCOSE SERPL-MCNC: 85 MG/DL (ref 65–140)
GLUCOSE UR STRIP-MCNC: NEGATIVE MG/DL
HCT VFR BLD AUTO: 46.3 % (ref 34.8–46.1)
HGB BLD-MCNC: 14.9 G/DL (ref 11.5–15.4)
HGB UR QL STRIP.AUTO: ABNORMAL
IMM GRANULOCYTES # BLD AUTO: 0.04 THOUSAND/UL (ref 0–0.2)
IMM GRANULOCYTES NFR BLD AUTO: 0 % (ref 0–2)
INR PPP: 1.09 (ref 0.85–1.19)
KETONES UR STRIP-MCNC: ABNORMAL MG/DL
LACTATE SERPL-SCNC: 0.6 MMOL/L (ref 0.5–2)
LACTATE SERPL-SCNC: 2.9 MMOL/L (ref 0.5–2)
LEUKOCYTE ESTERASE UR QL STRIP: ABNORMAL
LIPASE SERPL-CCNC: 14 U/L (ref 11–82)
LYMPHOCYTES # BLD AUTO: 1.29 THOUSANDS/ÂΜL (ref 0.6–4.47)
LYMPHOCYTES NFR BLD AUTO: 11 % (ref 14–44)
MAGNESIUM SERPL-MCNC: 1.7 MG/DL (ref 1.9–2.7)
MCH RBC QN AUTO: 29.4 PG (ref 26.8–34.3)
MCHC RBC AUTO-ENTMCNC: 32.2 G/DL (ref 31.4–37.4)
MCV RBC AUTO: 91 FL (ref 82–98)
MONOCYTES # BLD AUTO: 0.63 THOUSAND/ÂΜL (ref 0.17–1.22)
MONOCYTES NFR BLD AUTO: 5 % (ref 4–12)
MUCOUS THREADS UR QL AUTO: ABNORMAL
NEUTROPHILS # BLD AUTO: 9.87 THOUSANDS/ÂΜL (ref 1.85–7.62)
NEUTS SEG NFR BLD AUTO: 84 % (ref 43–75)
NITRITE UR QL STRIP: NEGATIVE
NON-SQ EPI CELLS URNS QL MICRO: ABNORMAL /HPF
NRBC BLD AUTO-RTO: 0 /100 WBCS
PH UR STRIP.AUTO: 6 [PH]
PLATELET # BLD AUTO: 144 THOUSANDS/UL (ref 149–390)
PLATELET # BLD AUTO: 200 THOUSANDS/UL (ref 149–390)
PMV BLD AUTO: 9.2 FL (ref 8.9–12.7)
PMV BLD AUTO: 9.4 FL (ref 8.9–12.7)
POTASSIUM SERPL-SCNC: 3.6 MMOL/L (ref 3.5–5.3)
PROCALCITONIN SERPL-MCNC: 0.38 NG/ML
PROCALCITONIN SERPL-MCNC: 0.73 NG/ML
PROT SERPL-MCNC: 7.6 G/DL (ref 6.4–8.4)
PROT UR STRIP-MCNC: ABNORMAL MG/DL
PROTHROMBIN TIME: 14.9 SECONDS (ref 12.3–15)
RBC # BLD AUTO: 5.07 MILLION/UL (ref 3.81–5.12)
RBC #/AREA URNS AUTO: ABNORMAL /HPF
RSV RNA RESP QL NAA+PROBE: NEGATIVE
SARS-COV-2 RNA RESP QL NAA+PROBE: NEGATIVE
SODIUM SERPL-SCNC: 135 MMOL/L (ref 135–147)
SP GR UR STRIP.AUTO: 1.02 (ref 1–1.03)
UROBILINOGEN UR STRIP-ACNC: <2 MG/DL
WBC # BLD AUTO: 11.86 THOUSAND/UL (ref 4.31–10.16)
WBC #/AREA URNS AUTO: ABNORMAL /HPF

## 2025-04-14 PROCEDURE — 99285 EMERGENCY DEPT VISIT HI MDM: CPT

## 2025-04-14 PROCEDURE — 83605 ASSAY OF LACTIC ACID: CPT | Performed by: EMERGENCY MEDICINE

## 2025-04-14 PROCEDURE — 87077 CULTURE AEROBIC IDENTIFY: CPT | Performed by: EMERGENCY MEDICINE

## 2025-04-14 PROCEDURE — 87186 SC STD MICRODIL/AGAR DIL: CPT | Performed by: EMERGENCY MEDICINE

## 2025-04-14 PROCEDURE — 84145 PROCALCITONIN (PCT): CPT | Performed by: STUDENT IN AN ORGANIZED HEALTH CARE EDUCATION/TRAINING PROGRAM

## 2025-04-14 PROCEDURE — 82948 REAGENT STRIP/BLOOD GLUCOSE: CPT

## 2025-04-14 PROCEDURE — 96375 TX/PRO/DX INJ NEW DRUG ADDON: CPT

## 2025-04-14 PROCEDURE — 85049 AUTOMATED PLATELET COUNT: CPT | Performed by: STUDENT IN AN ORGANIZED HEALTH CARE EDUCATION/TRAINING PROGRAM

## 2025-04-14 PROCEDURE — 83735 ASSAY OF MAGNESIUM: CPT | Performed by: EMERGENCY MEDICINE

## 2025-04-14 PROCEDURE — 85730 THROMBOPLASTIN TIME PARTIAL: CPT | Performed by: EMERGENCY MEDICINE

## 2025-04-14 PROCEDURE — 99285 EMERGENCY DEPT VISIT HI MDM: CPT | Performed by: EMERGENCY MEDICINE

## 2025-04-14 PROCEDURE — 85025 COMPLETE CBC W/AUTO DIFF WBC: CPT | Performed by: EMERGENCY MEDICINE

## 2025-04-14 PROCEDURE — 81001 URINALYSIS AUTO W/SCOPE: CPT | Performed by: EMERGENCY MEDICINE

## 2025-04-14 PROCEDURE — 0241U HB NFCT DS VIR RESP RNA 4 TRGT: CPT | Performed by: EMERGENCY MEDICINE

## 2025-04-14 PROCEDURE — 36415 COLL VENOUS BLD VENIPUNCTURE: CPT | Performed by: EMERGENCY MEDICINE

## 2025-04-14 PROCEDURE — 99223 1ST HOSP IP/OBS HIGH 75: CPT | Performed by: STUDENT IN AN ORGANIZED HEALTH CARE EDUCATION/TRAINING PROGRAM

## 2025-04-14 PROCEDURE — 96365 THER/PROPH/DIAG IV INF INIT: CPT

## 2025-04-14 PROCEDURE — 96367 TX/PROPH/DG ADDL SEQ IV INF: CPT

## 2025-04-14 PROCEDURE — 93005 ELECTROCARDIOGRAM TRACING: CPT

## 2025-04-14 PROCEDURE — 83690 ASSAY OF LIPASE: CPT | Performed by: EMERGENCY MEDICINE

## 2025-04-14 PROCEDURE — 84484 ASSAY OF TROPONIN QUANT: CPT | Performed by: EMERGENCY MEDICINE

## 2025-04-14 PROCEDURE — 87086 URINE CULTURE/COLONY COUNT: CPT | Performed by: EMERGENCY MEDICINE

## 2025-04-14 PROCEDURE — 74177 CT ABD & PELVIS W/CONTRAST: CPT

## 2025-04-14 PROCEDURE — 84145 PROCALCITONIN (PCT): CPT | Performed by: EMERGENCY MEDICINE

## 2025-04-14 PROCEDURE — 71260 CT THORAX DX C+: CPT

## 2025-04-14 PROCEDURE — 87040 BLOOD CULTURE FOR BACTERIA: CPT | Performed by: EMERGENCY MEDICINE

## 2025-04-14 PROCEDURE — 85610 PROTHROMBIN TIME: CPT | Performed by: EMERGENCY MEDICINE

## 2025-04-14 PROCEDURE — 80053 COMPREHEN METABOLIC PANEL: CPT | Performed by: EMERGENCY MEDICINE

## 2025-04-14 PROCEDURE — 71045 X-RAY EXAM CHEST 1 VIEW: CPT

## 2025-04-14 PROCEDURE — 96368 THER/DIAG CONCURRENT INF: CPT

## 2025-04-14 PROCEDURE — 96366 THER/PROPH/DIAG IV INF ADDON: CPT

## 2025-04-14 RX ORDER — ALBUMIN (HUMAN) 12.5 G/50ML
12.5 SOLUTION INTRAVENOUS ONCE
Status: DISCONTINUED | OUTPATIENT
Start: 2025-04-14 | End: 2025-04-14

## 2025-04-14 RX ORDER — HEPARIN SODIUM 5000 [USP'U]/ML
5000 INJECTION, SOLUTION INTRAVENOUS; SUBCUTANEOUS EVERY 8 HOURS SCHEDULED
Status: DISCONTINUED | OUTPATIENT
Start: 2025-04-14 | End: 2025-04-18 | Stop reason: HOSPADM

## 2025-04-14 RX ORDER — ACETAMINOPHEN 325 MG/1
650 TABLET ORAL EVERY 6 HOURS PRN
Status: DISCONTINUED | OUTPATIENT
Start: 2025-04-14 | End: 2025-04-16

## 2025-04-14 RX ORDER — ONDANSETRON 2 MG/ML
4 INJECTION INTRAMUSCULAR; INTRAVENOUS EVERY 6 HOURS PRN
Status: DISCONTINUED | OUTPATIENT
Start: 2025-04-14 | End: 2025-04-18 | Stop reason: HOSPADM

## 2025-04-14 RX ORDER — METOCLOPRAMIDE HYDROCHLORIDE 5 MG/ML
10 INJECTION INTRAMUSCULAR; INTRAVENOUS ONCE
Status: COMPLETED | OUTPATIENT
Start: 2025-04-14 | End: 2025-04-14

## 2025-04-14 RX ORDER — ACETAMINOPHEN 10 MG/ML
1000 INJECTION, SOLUTION INTRAVENOUS ONCE
Status: COMPLETED | OUTPATIENT
Start: 2025-04-14 | End: 2025-04-14

## 2025-04-14 RX ORDER — MAGNESIUM SULFATE HEPTAHYDRATE 40 MG/ML
2 INJECTION, SOLUTION INTRAVENOUS ONCE
Status: COMPLETED | OUTPATIENT
Start: 2025-04-14 | End: 2025-04-14

## 2025-04-14 RX ORDER — KETOROLAC TROMETHAMINE 30 MG/ML
15 INJECTION, SOLUTION INTRAMUSCULAR; INTRAVENOUS ONCE
Status: COMPLETED | OUTPATIENT
Start: 2025-04-14 | End: 2025-04-14

## 2025-04-14 RX ORDER — SODIUM CHLORIDE, SODIUM GLUCONATE, SODIUM ACETATE, POTASSIUM CHLORIDE, MAGNESIUM CHLORIDE, SODIUM PHOSPHATE, DIBASIC, AND POTASSIUM PHOSPHATE .53; .5; .37; .037; .03; .012; .00082 G/100ML; G/100ML; G/100ML; G/100ML; G/100ML; G/100ML; G/100ML
100 INJECTION, SOLUTION INTRAVENOUS CONTINUOUS
Status: DISPENSED | OUTPATIENT
Start: 2025-04-14 | End: 2025-04-15

## 2025-04-14 RX ORDER — DIPHENHYDRAMINE HYDROCHLORIDE 50 MG/ML
25 INJECTION, SOLUTION INTRAMUSCULAR; INTRAVENOUS ONCE
Status: COMPLETED | OUTPATIENT
Start: 2025-04-14 | End: 2025-04-14

## 2025-04-14 RX ADMIN — SODIUM CHLORIDE 1000 ML: 0.9 INJECTION, SOLUTION INTRAVENOUS at 17:35

## 2025-04-14 RX ADMIN — CEFEPIME 2000 MG: 2 INJECTION, POWDER, FOR SOLUTION INTRAVENOUS at 15:37

## 2025-04-14 RX ADMIN — ACETAMINOPHEN 650 MG: 325 TABLET, FILM COATED ORAL at 21:54

## 2025-04-14 RX ADMIN — SODIUM CHLORIDE 1000 ML: 0.9 INJECTION, SOLUTION INTRAVENOUS at 15:58

## 2025-04-14 RX ADMIN — DIPHENHYDRAMINE HYDROCHLORIDE 25 MG: 50 INJECTION, SOLUTION INTRAMUSCULAR; INTRAVENOUS at 15:39

## 2025-04-14 RX ADMIN — IOHEXOL 100 ML: 350 INJECTION, SOLUTION INTRAVENOUS at 16:29

## 2025-04-14 RX ADMIN — ACETAMINOPHEN 1000 MG: 10 INJECTION INTRAVENOUS at 15:39

## 2025-04-14 RX ADMIN — HEPARIN SODIUM 5000 UNITS: 5000 INJECTION INTRAVENOUS; SUBCUTANEOUS at 21:54

## 2025-04-14 RX ADMIN — VANCOMYCIN HYDROCHLORIDE 1500 MG: 5 INJECTION, POWDER, LYOPHILIZED, FOR SOLUTION INTRAVENOUS at 16:12

## 2025-04-14 RX ADMIN — METOCLOPRAMIDE 10 MG: 5 INJECTION, SOLUTION INTRAMUSCULAR; INTRAVENOUS at 15:39

## 2025-04-14 RX ADMIN — SODIUM CHLORIDE, SODIUM GLUCONATE, SODIUM ACETATE, POTASSIUM CHLORIDE, MAGNESIUM CHLORIDE, SODIUM PHOSPHATE, DIBASIC, AND POTASSIUM PHOSPHATE 100 ML/HR: .53; .5; .37; .037; .03; .012; .00082 INJECTION, SOLUTION INTRAVENOUS at 20:20

## 2025-04-14 RX ADMIN — KETOROLAC TROMETHAMINE 15 MG: 30 INJECTION, SOLUTION INTRAMUSCULAR; INTRAVENOUS at 17:29

## 2025-04-14 RX ADMIN — MAGNESIUM SULFATE HEPTAHYDRATE 2 G: 40 INJECTION, SOLUTION INTRAVENOUS at 15:52

## 2025-04-14 NOTE — ED PROVIDER NOTES
ED Disposition       None          Assessment & Plan       Medical Decision Making  Patient is a 59-year-old female past medical history of kidney disease, kidney stones, partial thyroidectomy presenting for fever.  Patient is ill-appearing at bedside with rigors with low-grade tachycardia, currently afebrile with bilateral CVA tenderness and no other significant physical exam findings.  Will obtain septic workup to assess for electrolyte abnormalities, leukocytosis, sources of infection, anemia, will give fluids, antibiotics, symptomatic management, obtain viral testing and continue to monitor.    Amount and/or Complexity of Data Reviewed  Labs: ordered.  Radiology: ordered.    Risk  Prescription drug management.        ED Course as of 04/14/25 2049 Mon Apr 14, 2025   1722 Patient with pyelonephritis and mild hypotension, will give further fluids and if pressure improves will admit to medicine.   1853 Patient now with downtrending pressure again after 30 cc/kg bolus, will initiate Levophed drip.       Medications   sodium chloride 0.9 % bolus 1,000 mL (has no administration in time range)   acetaminophen (Ofirmev) injection 1,000 mg (has no administration in time range)   metoclopramide (REGLAN) injection 10 mg (has no administration in time range)   diphenhydrAMINE (BENADRYL) injection 25 mg (has no administration in time range)   magnesium sulfate 2 g/50 mL IVPB (premix) 2 g (has no administration in time range)   cefepime (MAXIPIME) 2 g/50 mL dextrose IVPB (has no administration in time range)   vancomycin (VANCOCIN) 1500 mg in sodium chloride 0.9% 250 mL IVPB (has no administration in time range)       ED Risk Strat Scores                    No data recorded                            History of Present Illness       Chief Complaint   Patient presents with   • Fever     Fevers for the past 3 days. +chills/body aches       History reviewed. No pertinent past medical history.   Past Surgical History:    Procedure Laterality Date   • PARTIAL HYSTERECTOMY     • THYROIDECTOMY        Family History   Problem Relation Age of Onset   • No Known Problems Mother    • Liver cancer Father       Social History     Tobacco Use   • Smoking status: Never     Passive exposure: Never   • Smokeless tobacco: Never   Substance Use Topics   • Alcohol use: Not Currently   • Drug use: Never      E-Cigarette/Vaping      E-Cigarette/Vaping Substances      I have reviewed and agree with the history as documented.     Patient is a 59-year-old female past medical history of bifurcated kidneys, kidney stones, partial thyroidectomy with fever.  Patient notes fevers with Tmax 102.7 over the last 3 days, last dose of ibuprofen was at 6 AM this morning roughly 9 hours ago.  Notes lightheadedness, chills, body aches, global headache last night that is milder now, only frontal.  She notes right flank pain for the last 2 to 3 days intermittent.  She has a history of sepsis secondary to urinary tract obstruction and states she did not have urinary symptoms at that time and does not have them now.  She denies any head injuries, hematuria, rashes, vision changes, chest pain, cough, upper respiratory symptoms.  States last bowel movement was this morning, small soft but denies diarrhea.  Notes dry heaves but denies vomiting.  Notes fatigue it is unsure whether or not she has had any sick contacts that she states that her mother passed last week and there were many people at the .  Denies upper respiratory symptoms.        Review of Systems   All other systems reviewed and are negative.          Objective       ED Triage Vitals [25 1433]   Temperature Pulse Blood Pressure Respirations SpO2 Patient Position - Orthostatic VS   97.9 °F (36.6 °C) 102 132/71 (!) 23 99 % Sitting      Temp Source Heart Rate Source BP Location FiO2 (%) Pain Score    Oral Monitor Left arm -- --      Vitals      Date and Time Temp Pulse SpO2 Resp BP Pain Score FACES  Pain Rating User   04/14/25 1433 97.9 °F (36.6 °C) 102 99 % 23 132/71 -- -- GP            Physical Exam  Vitals reviewed.   Constitutional:       General: She is not in acute distress.     Appearance: Normal appearance. She is not ill-appearing.   HENT:      Mouth/Throat:      Mouth: Mucous membranes are moist.   Eyes:      Conjunctiva/sclera: Conjunctivae normal.   Cardiovascular:      Rate and Rhythm: Regular rhythm. Tachycardia present.      Pulses: Normal pulses.      Heart sounds: Normal heart sounds.   Pulmonary:      Effort: Pulmonary effort is normal.      Breath sounds: Normal breath sounds.   Abdominal:      General: Abdomen is flat.      Palpations: Abdomen is soft.      Tenderness: There is no abdominal tenderness. There is right CVA tenderness and left CVA tenderness.   Musculoskeletal:         General: No swelling. Normal range of motion.      Cervical back: Neck supple.      Right lower leg: No edema.      Left lower leg: No edema.   Skin:     General: Skin is warm and dry.   Neurological:      General: No focal deficit present.      Mental Status: She is alert.   Psychiatric:         Mood and Affect: Mood normal.         Results Reviewed       None            No orders to display       ECG 12 Lead Documentation Only    Date/Time: 4/14/2025 3:09 PM    Performed by: Tala Morales DO  Authorized by: Tala Morales DO    Patient location:  ED  Previous ECG:     Previous ECG:  Unavailable  Interpretation:     Interpretation: non-specific    Quality:     Tracing quality:  Limited by artifact  Rate:     ECG rate assessment: normal    Rhythm:     Rhythm: sinus rhythm    Ectopy:     Ectopy: none    QRS:     QRS axis:  Normal    QRS intervals:  Normal  Conduction:     Conduction: normal    ST segments:     ST segments:  Normal  T waves:     T waves: non-specific        ED Medication and Procedure Management   Prior to Admission Medications   Prescriptions Last Dose Informant Patient Reported?  Taking?   cyclobenzaprine (FLEXERIL) 10 mg tablet   No No   Sig: Take 1 tablet (10 mg total) by mouth daily at bedtime for 10 days   ibuprofen (MOTRIN) 600 mg tablet   No No   Sig: Take one tablet every 6 hours with food for pain x 1 week if needed      Facility-Administered Medications: None     Patient's Medications   Discharge Prescriptions    No medications on file     No discharge procedures on file.  ED SEPSIS DOCUMENTATION            Tala Morales DO  04/14/25 [id number]

## 2025-04-14 NOTE — H&P
H&P - Hospitalist   Name: Adrienne Mendes 59 y.o. female I MRN: [id number]  Unit/Bed#: ED 20 I Date of Admission: 4/14/2025   Date of Service: 4/14/2025 I Hospital Day: 1     Assessment & Plan  Sepsis without acute organ dysfunction (HCC)  Patient presented with high-grade fever at home maximum 102, chills, generalized weakness  She reported she has lower backache but more on the right side.  Patient has CVA tenderness on right side  Imaging studies showed suspected left pyelonephritis, lactic acid 2.9 trended down to normal elevated procalcitonin 0.39 white blood cells almost 12K  UA showed bacteriuria/pyuria and hematuria  Patient was given vancomycin and cefepime.  2 L of IV fluids given in ER  Continue with IV fluids, IV ceftriaxone  Pyelonephritis of left kidney  Plan as above      VTE Pharmacologic Prophylaxis:   Moderate Risk (Score 3-4) - Pharmacological DVT Prophylaxis Ordered: enoxaparin (Lovenox).  Code Status: Level 1 - Full Code   Discussion with family:  Updated patient.     Anticipated Length of Stay: Patient will be admitted on an observation basis with an anticipated length of stay of less than 2 midnights secondary to sepsis.    History of Present Illness   Chief Complaint: Fever, lower backache chills generalized weakness for 3 days    Adrienne Mendes is a 59 y.o. female without any significant past medical history who presents with high-grade fever, generalized weakness chills for 3 days.  Associated with lower back pain but more on the right side.        Patient denied complaint of chest pain, shortness of breath orthopnea, PND, nausea vomiting or diarrhea    Review of Systems all are negative except mentioned above    Historical Information   History reviewed. No pertinent past medical history.  Past Surgical History:   Procedure Laterality Date    PARTIAL HYSTERECTOMY      THYROIDECTOMY       Social History     Tobacco Use    Smoking status: Never     Passive exposure: Never    Smokeless tobacco:  Never   Substance and Sexual Activity    Alcohol use: Not Currently    Drug use: Never    Sexual activity: Not on file     E-Cigarette/Vaping     E-Cigarette/Vaping Substances     Family history non-contributory  Social History:  Marital Status:    Patient Pre-hospital Living Situation: Home  Patient Pre-hospital Level of Mobility: walks  Patient Pre-hospital Diet Restrictions: none    Meds/Allergies   I have reviewed home medications with patient personally.  Prior to Admission medications    Medication Sig Start Date End Date Taking? Authorizing Provider   cyclobenzaprine (FLEXERIL) 10 mg tablet Take 1 tablet (10 mg total) by mouth daily at bedtime for 10 days 3/25/25 4/4/25  Noelle Madrigal DO   ibuprofen (MOTRIN) 600 mg tablet Take one tablet every 6 hours with food for pain x 1 week if needed 3/25/25   Noelle Madrigal DO     Allergies   Allergen Reactions    Sulfur Rash       Objective :  Temp:  [97.9 °F (36.6 °C)-98.2 °F (36.8 °C)] 98.2 °F (36.8 °C)  HR:  [] 89  BP: ()/(45-71) 110/63  Resp:  [13-23] 17  SpO2:  [95 %-99 %] 97 %  O2 Device: None (Room air)    Physical Exam   Constitutional: Ill-appearing  HEENT: No pallor or icterus  CVS: S1 plus S2  Respiratory: Normal vascular breathe without crackles and wheeze  Gastroenterology: Soft nontender without any palpable mass  Skin: No bruises or ecchymosis  Neurology: No focal logical deficit      Lines/Drains:            Lab Results: I have reviewed the following results:  Results from last 7 days   Lab Units 04/14/25  1541   WBC Thousand/uL 11.86*   HEMOGLOBIN g/dL 14.9   HEMATOCRIT % 46.3*   PLATELETS Thousands/uL 200   SEGS PCT % 84*   LYMPHO PCT % 11*   MONO PCT % 5   EOS PCT % 0     Results from last 7 days   Lab Units 04/14/25  1541   SODIUM mmol/L 135   POTASSIUM mmol/L 3.6   CHLORIDE mmol/L 100   CO2 mmol/L 25   BUN mg/dL 9   CREATININE mg/dL 0.75   ANION GAP mmol/L 10   CALCIUM mg/dL 9.5   ALBUMIN g/dL 4.2   TOTAL BILIRUBIN mg/dL  "1.00   ALK PHOS U/L 94   ALT U/L 10   AST U/L 12*   GLUCOSE RANDOM mg/dL 111     Results from last 7 days   Lab Units 04/14/25  1541   INR  1.09     Results from last 7 days   Lab Units 04/14/25  1507   POC GLUCOSE mg/dl 85     No results found for: \"HGBA1C\"  Results from last 7 days   Lab Units 04/14/25  1801 04/14/25  1541   LACTIC ACID mmol/L 0.6 2.9*   PROCALCITONIN ng/ml  --  0.38*       Imaging Results Review: I reviewed radiology reports from this admission including: CT abdomen/pelvis.  Other Study Results Review: Other studies reviewed include: CBC and BMP    Administrative Statements   I have spent a total time of 89 minutes in caring for this patient on the day of the visit/encounter including Counseling / Coordination of care, Documenting in the medical record, Reviewing/placing orders in the medical record (including tests, medications, and/or procedures), Obtaining or reviewing history  , and Communicating with other healthcare professionals .    ** Please Note: This note has been constructed using a voice recognition system. **    "

## 2025-04-14 NOTE — ASSESSMENT & PLAN NOTE
Patient presented with high-grade fever at home maximum 102, chills, generalized weakness  She reported she has lower backache but more on the right side.  Patient has CVA tenderness on right side  Imaging studies showed suspected left pyelonephritis, lactic acid 2.9 trended down to normal elevated procalcitonin 0.39 white blood cells almost 12K  UA showed bacteriuria/pyuria and hematuria  Patient was given vancomycin and cefepime.  2 L of IV fluids given in ER  Continue with IV fluids, IV ceftriaxone

## 2025-04-15 ENCOUNTER — APPOINTMENT (OUTPATIENT)
Dept: CT IMAGING | Facility: HOSPITAL | Age: 60
DRG: 720 | End: 2025-04-15
Payer: COMMERCIAL

## 2025-04-15 LAB
ANION GAP SERPL CALCULATED.3IONS-SCNC: 7 MMOL/L (ref 4–13)
BUN SERPL-MCNC: 7 MG/DL (ref 5–25)
CALCIUM SERPL-MCNC: 8 MG/DL (ref 8.4–10.2)
CHLORIDE SERPL-SCNC: 109 MMOL/L (ref 96–108)
CO2 SERPL-SCNC: 22 MMOL/L (ref 21–32)
CREAT SERPL-MCNC: 0.55 MG/DL (ref 0.6–1.3)
ERYTHROCYTE [DISTWIDTH] IN BLOOD BY AUTOMATED COUNT: 11.6 % (ref 11.6–15.1)
GFR SERPL CREATININE-BSD FRML MDRD: 102 ML/MIN/1.73SQ M
GLUCOSE P FAST SERPL-MCNC: 121 MG/DL (ref 65–99)
GLUCOSE SERPL-MCNC: 121 MG/DL (ref 65–140)
HCT VFR BLD AUTO: 36.3 % (ref 34.8–46.1)
HGB BLD-MCNC: 12.1 G/DL (ref 11.5–15.4)
MCH RBC QN AUTO: 29.6 PG (ref 26.8–34.3)
MCHC RBC AUTO-ENTMCNC: 32.5 G/DL (ref 31.4–37.4)
MCV RBC AUTO: 91 FL (ref 82–98)
PLATELET # BLD AUTO: 133 THOUSANDS/UL (ref 149–390)
PMV BLD AUTO: 9.6 FL (ref 8.9–12.7)
POTASSIUM SERPL-SCNC: 3.4 MMOL/L (ref 3.5–5.3)
PROCALCITONIN SERPL-MCNC: 1.86 NG/ML
RBC # BLD AUTO: 3.98 MILLION/UL (ref 3.81–5.12)
SODIUM SERPL-SCNC: 138 MMOL/L (ref 135–147)
WBC # BLD AUTO: 6.79 THOUSAND/UL (ref 4.31–10.16)

## 2025-04-15 PROCEDURE — 84145 PROCALCITONIN (PCT): CPT | Performed by: STUDENT IN AN ORGANIZED HEALTH CARE EDUCATION/TRAINING PROGRAM

## 2025-04-15 PROCEDURE — 70450 CT HEAD/BRAIN W/O DYE: CPT

## 2025-04-15 PROCEDURE — 80048 BASIC METABOLIC PNL TOTAL CA: CPT | Performed by: STUDENT IN AN ORGANIZED HEALTH CARE EDUCATION/TRAINING PROGRAM

## 2025-04-15 PROCEDURE — 85027 COMPLETE CBC AUTOMATED: CPT | Performed by: STUDENT IN AN ORGANIZED HEALTH CARE EDUCATION/TRAINING PROGRAM

## 2025-04-15 PROCEDURE — 99232 SBSQ HOSP IP/OBS MODERATE 35: CPT | Performed by: INTERNAL MEDICINE

## 2025-04-15 RX ORDER — KETOROLAC TROMETHAMINE 30 MG/ML
15 INJECTION, SOLUTION INTRAMUSCULAR; INTRAVENOUS ONCE
Status: COMPLETED | OUTPATIENT
Start: 2025-04-15 | End: 2025-04-15

## 2025-04-15 RX ORDER — SODIUM CHLORIDE, SODIUM GLUCONATE, SODIUM ACETATE, POTASSIUM CHLORIDE, MAGNESIUM CHLORIDE, SODIUM PHOSPHATE, DIBASIC, AND POTASSIUM PHOSPHATE .53; .5; .37; .037; .03; .012; .00082 G/100ML; G/100ML; G/100ML; G/100ML; G/100ML; G/100ML; G/100ML
100 INJECTION, SOLUTION INTRAVENOUS CONTINUOUS
Status: DISPENSED | OUTPATIENT
Start: 2025-04-15 | End: 2025-04-17

## 2025-04-15 RX ORDER — SUMATRIPTAN 6 MG/.5ML
6 INJECTION, SOLUTION SUBCUTANEOUS ONCE
Status: COMPLETED | OUTPATIENT
Start: 2025-04-15 | End: 2025-04-15

## 2025-04-15 RX ORDER — AMOXICILLIN 250 MG
1 CAPSULE ORAL 2 TIMES DAILY
Status: DISCONTINUED | OUTPATIENT
Start: 2025-04-15 | End: 2025-04-18 | Stop reason: HOSPADM

## 2025-04-15 RX ORDER — CYCLOBENZAPRINE HCL 10 MG
10 TABLET ORAL ONCE
Status: COMPLETED | OUTPATIENT
Start: 2025-04-15 | End: 2025-04-15

## 2025-04-15 RX ORDER — OXYCODONE HYDROCHLORIDE 5 MG/1
5 TABLET ORAL ONCE
Refills: 0 | Status: DISCONTINUED | OUTPATIENT
Start: 2025-04-15 | End: 2025-04-18 | Stop reason: HOSPADM

## 2025-04-15 RX ADMIN — ACETAMINOPHEN 650 MG: 325 TABLET, FILM COATED ORAL at 05:28

## 2025-04-15 RX ADMIN — KETOROLAC TROMETHAMINE 15 MG: 30 INJECTION, SOLUTION INTRAMUSCULAR; INTRAVENOUS at 04:03

## 2025-04-15 RX ADMIN — SODIUM CHLORIDE, SODIUM GLUCONATE, SODIUM ACETATE, POTASSIUM CHLORIDE, MAGNESIUM CHLORIDE, SODIUM PHOSPHATE, DIBASIC, AND POTASSIUM PHOSPHATE 100 ML/HR: .53; .5; .37; .037; .03; .012; .00082 INJECTION, SOLUTION INTRAVENOUS at 14:05

## 2025-04-15 RX ADMIN — CYCLOBENZAPRINE HYDROCHLORIDE 10 MG: 10 TABLET, FILM COATED ORAL at 21:18

## 2025-04-15 RX ADMIN — HEPARIN SODIUM 5000 UNITS: 5000 INJECTION INTRAVENOUS; SUBCUTANEOUS at 14:05

## 2025-04-15 RX ADMIN — ACETAMINOPHEN 650 MG: 325 TABLET, FILM COATED ORAL at 20:14

## 2025-04-15 RX ADMIN — ACETAMINOPHEN 650 MG: 325 TABLET, FILM COATED ORAL at 14:15

## 2025-04-15 RX ADMIN — Medication 1000 MG: at 09:12

## 2025-04-15 RX ADMIN — SUMATRIPTAN 6 MG: 6 INJECTION SUBCUTANEOUS at 09:20

## 2025-04-15 RX ADMIN — ONDANSETRON 4 MG: 2 INJECTION INTRAMUSCULAR; INTRAVENOUS at 03:33

## 2025-04-15 RX ADMIN — SENNOSIDES AND DOCUSATE SODIUM 1 TABLET: 50; 8.6 TABLET ORAL at 14:05

## 2025-04-15 NOTE — PLAN OF CARE
Problem: GENITOURINARY - ADULT  Goal: Maintains or returns to baseline urinary function  Description: INTERVENTIONS:- Assess urinary function- Encourage oral fluids to ensure adequate hydration if ordered- Administer IV fluids as ordered to ensure adequate hydration- Administer ordered medications as needed- Offer frequent toileting- Follow urinary retention protocol if ordered  Reactivated  Goal: Absence of urinary retention  Description: INTERVENTIONS:- Assess patient’s ability to void and empty bladder- Monitor I/O- Bladder scan as needed- Discuss with physician/AP medications to alleviate retention as needed- Discuss catheterization for long term situations as appropriate  Reactivated  Goal: Urinary catheter remains patent  Description: INTERVENTIONS:- Assess patency of urinary catheter- If patient has a chronic oliva, consider changing catheter if non-functioning- Follow guidelines for intermittent irrigation of non-functioning urinary catheter  Reactivated     Problem: PAIN - ADULT  Goal: Verbalizes/displays adequate comfort level or baseline comfort level  Description: Interventions:- Encourage patient to monitor pain and request assistance- Assess pain using appropriate pain scale- Administer analgesics based on type and severity of pain and evaluate response- Implement non-pharmacological measures as appropriate and evaluate response- Consider cultural and social influences on pain and pain management- Notify physician/advanced practitioner if interventions unsuccessful or patient reports new pain  Reactivated     Problem: INFECTION - ADULT  Goal: Absence or prevention of progression during hospitalization  Description: INTERVENTIONS:- Assess and monitor for signs and symptoms of infection- Monitor lab/diagnostic results- Monitor all insertion sites, i.e. indwelling lines, tubes, and drains- Monitor endotracheal if appropriate and nasal secretions for changes in amount and color- [place] appropriate  cooling/warming therapies per order- Administer medications as ordered- Instruct and encourage patient and family to use good hand hygiene technique- Identify and instruct in appropriate isolation precautions for identified infection/condition  Reactivated  Goal: Absence of fever/infection during neutropenic period  Description: INTERVENTIONS:- Monitor WBC  Reactivated     Problem: Knowledge Deficit  Goal: Patient/family/caregiver demonstrates understanding of disease process, treatment plan, medications, and discharge instructions  Description: Complete learning assessment and assess knowledge base.Interventions:- Provide teaching at level of understanding- Provide teaching via preferred learning methods  Reactivated

## 2025-04-15 NOTE — PLAN OF CARE
Problem: Potential for Falls  Goal: Patient will remain free of falls  Description: INTERVENTIONS:- Educate patient/family on patient safety including physical limitations- Instruct patient to call for assistance with activity - Consult OT/PT to assist with strengthening/mobility - Keep Call bell within reach- Keep bed low and locked with side rails adjusted as appropriate- Keep care items and personal belongings within reach- Initiate and maintain comfort rounds- Make Fall Risk Sign visible to staff- Offer Toileting every  Hours, in advance of need- Initiate/Maintain alarm- Obtain necessary fall risk management equipm- Apply yellow socks and bracelet for high fall risk patients- Consider moving patient to room near nurses station  4/15/2025 1905 by Sue Bassett RN  Outcome: Progressing  4/15/2025 1904 by Sue Bassett RN  Outcome: Progressing     Problem: GENITOURINARY - ADULT  Goal: Maintains or returns to baseline urinary function  Description: INTERVENTIONS:- Assess urinary function- Encourage oral fluids to ensure adequate hydration if ordered- Administer IV fluids as ordered to ensure adequate hydration- Administer ordered medications as needed- Offer frequent toileting- Follow urinary retention protocol if ordered  4/15/2025 1905 by Sue Bassett RN  Outcome: Progressing  4/15/2025 1904 by Sue Bassett RN  Outcome: Progressing  Goal: Absence of urinary retention  Description: INTERVENTIONS:- Assess patient’s ability to void and empty bladder- Monitor I/O- Bladder scan as needed- Discuss with physician/AP medications to alleviate retention as needed- Discuss catheterization for long term situations as appropriate  4/15/2025 1905 by Sue Bassett RN  Outcome: Progressing  4/15/2025 1904 by Sue Bassett RN  Outcome: Progressing  Goal: Urinary catheter remains patent  Description: INTERVENTIONS:- Assess patency of urinary catheter- If patient has a chronic oliva, consider changing  catheter if non-functioning- Follow guidelines for intermittent irrigation of non-functioning urinary catheter  4/15/2025 1905 by Sue Bassett RN  Outcome: Progressing  4/15/2025 1904 by Sue Bassett RN  Outcome: Progressing     Problem: PAIN - ADULT  Goal: Verbalizes/displays adequate comfort level or baseline comfort level  Description: Interventions:- Encourage patient to monitor pain and request assistance- Assess pain using appropriate pain scale- Administer analgesics based on type and severity of pain and evaluate response- Implement non-pharmacological measures as appropriate and evaluate response- Consider cultural and social influences on pain and pain management- Notify physician/advanced practitioner if interventions unsuccessful or patient reports new pain  4/15/2025 1905 by Sue Bassett RN  Outcome: Progressing  4/15/2025 1904 by Sue Bassett RN  Outcome: Progressing     Problem: INFECTION - ADULT  Goal: Absence or prevention of progression during hospitalization  Description: INTERVENTIONS:- Assess and monitor for signs and symptoms of infection- Monitor lab/diagnostic results- Monitor all insertion sites, i.e. indwelling lines, tubes, and drains- Monitor endotracheal if appropriate and nasal secretions for changes in amount and color- [place] appropriate cooling/warming therapies per order- Administer medications as ordered- Instruct and encourage patient and family to use good hand hygiene technique- Identify and instruct in appropriate isolation precautions for identified infection/condition  4/15/2025 1905 by Sue Bassett RN  Outcome: Progressing  4/15/2025 1904 by Sue Bassett RN  Outcome: Progressing  Goal: Absence of fever/infection during neutropenic period  Description: INTERVENTIONS:- Monitor WBC  4/15/2025 1905 by Sue Bassett RN  Outcome: Progressing  4/15/2025 1904 by Sue Bassett RN  Outcome: Progressing     Problem: Knowledge Deficit  Goal:  Patient/family/caregiver demonstrates understanding of disease process, treatment plan, medications, and discharge instructions  Description: Complete learning assessment and assess knowledge base.Interventions:- Provide teaching at level of understanding- Provide teaching via preferred learning methods  4/15/2025 1905 by Sue Bassett RN  Outcome: Progressing  4/15/2025 1904 by Sue Bassett RN  Outcome: Progressing

## 2025-04-15 NOTE — ASSESSMENT & PLAN NOTE
Patient presented with high-grade fever at home maximum 102, chills, generalized weakness  She reported she has lower backache but more on the right side.  Patient has CVA tenderness on right side  Imaging studies showed suspected left pyelonephritis, lactic acid 2.9 trended down to normal elevated procalcitonin 0.39 white blood cells almost 12K  Procalcitonin increased to 1.7 on 4/15/2025  UA showed bacteriuria/pyuria and hematuria  Patient was given vancomycin and cefepime in the emergency room  Continue with IV fluids, IV ceftriaxone  Patient continued to have spikes of fever.  Patient stated that she has had multiple UTIs in the past.  Will get urology consult as patient has history of stones.

## 2025-04-15 NOTE — PROGRESS NOTES
Progress Note - Hospitalist   Name: Adrienne Mendes 59 y.o. female I MRN: [id number]  Unit/Bed#: -01 I Date of Admission: 4/14/2025   Date of Service: 4/15/2025 I Hospital Day: 1    Assessment & Plan  Sepsis without acute organ dysfunction (HCC)  Patient presented with high-grade fever at home maximum 102, chills, generalized weakness  She reported she has lower backache but more on the right side.  Patient has CVA tenderness on right side  Imaging studies showed suspected left pyelonephritis, lactic acid 2.9 trended down to normal elevated procalcitonin 0.39 white blood cells almost 12K  Procalcitonin increased to 1.7 on 4/15/2025  UA showed bacteriuria/pyuria and hematuria  Patient was given vancomycin and cefepime in the emergency room  Continue with IV fluids, IV ceftriaxone  Patient continued to have spikes of fever.  Patient stated that she has had multiple UTIs in the past.  Will get urology consult as patient has history of stones.  Pyelonephritis of left kidney  Plan as above    VTE Pharmacologic Prophylaxis:   Heparin    Mobility:   Basic Mobility Inpatient Raw Score: 22  JH-HLM Goal: 7: Walk 25 feet or more  JH-HLM Achieved: 7: Walk 25 feet or more  JH-HLM Goal achieved. Continue to encourage appropriate mobility.    Patient Centered Rounds: I performed bedside rounds with nursing staff today.   Discussions with Specialists or Other Care Team Provider: Care team    Education and Discussions with Family / Patient: Patient declined call to .     Current Length of Stay: 1 day(s)  Current Patient Status: Observation   Certification Statement: The patient will continue to require additional inpatient hospital stay due to sepsis secondary to acute pyelonephritis  Discharge Plan: Anticipate discharge in 24-48 hrs to home.    Code Status: Level 1 - Full Code    Subjective   Patient seen and examined at bedside by me.  No chest pain, palpitations diaphoresis.  Continues to have some fever/  chills at this point of time.  Tmax was 102.  Patient has a weakness in the weakness generalized.  No dysuria or diarrhea      Objective :  Temp:  [98.2 °F (36.8 °C)-102.8 °F (39.3 °C)] 99.8 °F (37.7 °C)  HR:  [71-94] 76  BP: ()/(50-88) 109/70  Resp:  [13-19] 18  SpO2:  [94 %-100 %] 97 %  O2 Device: None (Room air)    Body mass index is 28.34 kg/m².     Input and Output Summary (last 24 hours):     Intake/Output Summary (Last 24 hours) at 4/15/2025 1709  Last data filed at 4/15/2025 1405  Gross per 24 hour   Intake 1000 ml   Output --   Net 1000 ml       Physical Exam    General exam- looks a little weak  HEENT - atraumatic and normocephalic  Neck- supple  Skin - no fresh rash  Extremities no fresh focal deformities  Cardiovascular- S1-S2 heard  Respiratory- bilateral air entry present, no crackles or rhonchi  Skin - no fresh rash  Abdomen - normal bowel sounds present, no rebound tenderness  CNS- No fresh focal deficits  Psych- no acute psychosis      Lines/Drains:              Lab Results: I have reviewed the following results:   Results from last 7 days   Lab Units 04/15/25  0500 04/14/25  1921 04/14/25  1541   WBC Thousand/uL 6.79  --  11.86*   HEMOGLOBIN g/dL 12.1  --  14.9   HEMATOCRIT % 36.3  --  46.3*   PLATELETS Thousands/uL 133*   < > 200   SEGS PCT %  --   --  84*   LYMPHO PCT %  --   --  11*   MONO PCT %  --   --  5   EOS PCT %  --   --  0    < > = values in this interval not displayed.     Results from last 7 days   Lab Units 04/15/25  0500 04/14/25  1541   SODIUM mmol/L 138 135   POTASSIUM mmol/L 3.4* 3.6   CHLORIDE mmol/L 109* 100   CO2 mmol/L 22 25   BUN mg/dL 7 9   CREATININE mg/dL 0.55* 0.75   ANION GAP mmol/L 7 10   CALCIUM mg/dL 8.0* 9.5   ALBUMIN g/dL  --  4.2   TOTAL BILIRUBIN mg/dL  --  1.00   ALK PHOS U/L  --  94   ALT U/L  --  10   AST U/L  --  12*   GLUCOSE RANDOM mg/dL 121 111     Results from last 7 days   Lab Units 04/14/25  1541   INR  1.09     Results from last 7 days   Lab Units  04/14/25  1507   POC GLUCOSE mg/dl 85         Results from last 7 days   Lab Units 04/15/25  0500 04/14/25  1921 04/14/25  1801 04/14/25  1541   LACTIC ACID mmol/L  --   --  0.6 2.9*   PROCALCITONIN ng/ml 1.86* 0.73*  --  0.38*       Recent Cultures (last 7 days):   Results from last 7 days   Lab Units 04/14/25  1523   BLOOD CULTURE  Received in Microbiology Lab. Culture in Progress.  Received in Microbiology Lab. Culture in Progress.       Imaging Results Review: I personally reviewed the following image studies/reports in PACS and discussed pertinent findings with Radiology: CT abdomen/pelvis. My interpretation of the radiology images/reports is: No nephritis.  Other Study Results Review: No additional pertinent studies reviewed.    Last 24 Hours Medication List:     Current Facility-Administered Medications:     acetaminophen (TYLENOL) tablet 650 mg, Q6H PRN    ceftriaxone (ROCEPHIN) 1 g/50 mL in dextrose IVPB, Q24H, Last Rate: 1,000 mg (04/15/25 0912)    heparin (porcine) subcutaneous injection 5,000 Units, Q8H DAKSHA **AND** [COMPLETED] Platelet count, Once    multi-electrolyte (Plasmalyte-A/Isolyte-S PH 7.4/Normosol-R) IV solution, Continuous, Last Rate: 100 mL/hr (04/15/25 1405)    ondansetron (ZOFRAN) injection 4 mg, Q6H PRN    oxyCODONE (ROXICODONE) IR tablet 5 mg, Once    senna-docusate sodium (SENOKOT S) 8.6-50 mg per tablet 1 tablet, BID    Administrative Statements   Today, Patient Was Seen By: Mark Mcfarland MD      **Please Note: This note may have been constructed using a voice recognition system.**

## 2025-04-15 NOTE — UTILIZATION REVIEW
Initial Clinical Review    Admission: Date/Time/Statement:   Admission Orders (From admission, onward)       Ordered        04/14/25 1917  Place in Observation  Once                          Orders Placed This Encounter   Procedures    Place in Observation     Standing Status:   Standing     Number of Occurrences:   1     Level of Care:   Med Surg [16]     ED Arrival Information       Expected   -    Arrival   4/14/2025 14:31    Acuity   Urgent              Means of arrival   Walk-In    Escorted by   Family Member    Service   Hospitalist    Admission type   Emergency              Arrival complaint   Fever             Chief Complaint   Patient presents with    Fever     Fevers for the past 3 days. +chills/body aches       Initial Presentation: 59 y.o. female to the ED from home with complaints of fever for the past 3 days. Admitted under observation for sepsis, pyelonephitis left kidney.  No significant medical history. Arrives tachycardic, with bilateral CVA tenderness. In the ED, given 1 liter iv fluids, started on iv abx, iv mag,give IV reglan.         Date: 4/15   Day 2:       ED Treatment-Medication Administration from 04/14/2025 1430 to 04/14/2025 1954         Date/Time Order Dose Route Action     04/14/2025 1558 sodium chloride 0.9 % bolus 1,000 mL 1,000 mL Intravenous New Bag     04/14/2025 1539 acetaminophen (Ofirmev) injection 1,000 mg 1,000 mg Intravenous New Bag     04/14/2025 1539 metoclopramide (REGLAN) injection 10 mg 10 mg Intravenous Given     04/14/2025 1539 diphenhydrAMINE (BENADRYL) injection 25 mg 25 mg Intravenous Given     04/14/2025 1552 magnesium sulfate 2 g/50 mL IVPB (premix) 2 g 2 g Intravenous New Bag     04/14/2025 1537 cefepime (MAXIPIME) 2 g/50 mL dextrose IVPB 2,000 mg Intravenous New Bag     04/14/2025 1612 vancomycin (VANCOCIN) 1500 mg in sodium chloride 0.9% 250 mL IVPB 1,500 mg Intravenous New Bag     04/14/2025 1629 iohexol (OMNIPAQUE) 350 MG/ML injection (MULTI-DOSE) 100 mL 100  mL Intravenous Given     04/14/2025 1735 sodium chloride 0.9 % bolus 1,000 mL 1,000 mL Intravenous New Bag     04/14/2025 1729 ketorolac (TORADOL) injection 15 mg 15 mg Intravenous Given            Scheduled Medications:  cefTRIAXone, 1,000 mg, Intravenous, Q24H  heparin (porcine), 5,000 Units, Subcutaneous, Q8H DAKSHA      Continuous IV Infusions:     PRN Meds:  acetaminophen, 650 mg, Oral, Q6H PRN  ondansetron, 4 mg, Intravenous, Q6H PRN      ED Triage Vitals [04/14/25 1432]   Temperature Pulse Respirations Blood Pressure SpO2 Pain Score   97.9 °F (36.6 °C) 101 17 132/71 99 % 5     Weight (last 2 days)       Date/Time Weight    04/14/25 1929 68 (150)            Vital Signs (last 3 days)       Date/Time Temp Pulse Resp BP MAP (mmHg) SpO2 O2 Device Patient Position - Orthostatic VS Breanne Coma Scale Score Pain    04/15/25 09:28:13 -- 71 -- 116/67 83 99 % -- -- -- --    04/15/25 09:24:51 -- 72 -- 114/69 84 99 % -- -- -- --    04/15/25 09:09:15 -- 79 -- 95/53 67 95 % -- -- -- --    04/15/25 07:28:45 99 °F (37.2 °C) 80 19 96/53 67 95 % None (Room air) Lying -- --    04/15/25 05:50:23 99.3 °F (37.4 °C) 81 -- 114/88 97 94 % -- -- -- --    04/15/25 0528 -- -- -- -- -- -- -- -- -- 8    04/15/25 0403 -- -- -- -- -- -- -- -- -- 8    04/15/25 03:25:26 99.3 °F (37.4 °C) 90 -- 102/66 78 99 % -- -- -- --    04/14/25 2154 -- -- -- -- -- -- -- -- -- 5    04/14/25 21:52:03 99 °F (37.2 °C) 77 -- 102/62 75 100 % -- -- -- --    04/14/25 2006 -- -- -- -- -- -- -- -- -- No Pain    04/14/25 20:04:06 99 °F (37.2 °C) 83 -- 103/62 76 96 % -- -- -- --    04/14/25 2000 -- -- -- -- -- -- -- -- 15 No Pain    04/14/25 1909 -- 89 17 110/63 80 97 % -- -- -- --    04/14/25 1900 -- 80 13 90/51 67 95 % -- -- -- --    04/14/25 1853 -- -- -- 82/50 -- -- -- -- -- --    04/14/25 1845 98.2 °F (36.8 °C) 80 17 88/51 65 96 % None (Room air) Lying -- 5 04/14/25 1830 -- 86 17 103/57 74 96 % None (Room air) Lying -- 5 04/14/25 1815 -- 86 19 102/57 75 96 %  None (Room air) Lying -- 5    04/14/25 1800 -- 83 18 100/59 77 95 % None (Room air) Lying -- No Pain    04/14/25 1745 -- 87 18 101/54 74 96 % None (Room air) Lying -- 5    04/14/25 1730 -- 86 17 101/55 75 95 % None (Room air) Lying -- 5 04/14/25 1729 -- -- -- -- -- -- -- -- -- 5 04/14/25 1700 -- 91 17 94/45 65 95 % None (Room air) Lying -- 8    04/14/25 1645 -- 107 17 93/47  68 97 % None (Room air) Lying -- 8    04/14/25 1600 -- 101 17 108/53 77 97 % None (Room air) Lying -- 5 04/14/25 1444 -- -- -- -- -- -- None (Room air) -- -- --    04/14/25 1433 97.9 °F (36.6 °C) 102 23 132/71 -- 99 % None (Room air) Sitting -- --    04/14/25 1432 97.9 °F (36.6 °C) 101 17 132/71 -- 99 % None (Room air) Sitting 15 5            Pertinent Labs/Diagnostic Test Results:   Radiology:  CT head wo contrast   Final Interpretation by Jeremiah Flores MD (04/15 0657)      No significant abnormality evident.         Jeremiah Flores M.D., FACR   Senior Member, [employer]                  Workstation performed: [id number]         CT chest abdomen pelvis w contrast   Final Interpretation by Constantin Medrano MD (04/14 1720)      1.  New 2.1 cm heterogeneously enhancing lesion in the left kidney (series 602, image 68). Differential diagnosis includes focal pyelonephritis. Other mass lesions not excluded. Further clinical assessment and follow-up advised.   2.  New fragmentation and sclerosis in the superior endplate of T11 possibly related to Schmorl's node. Further clinical assessment advised.   3.  Duplicated right renal collecting system with atrophy and scarring of the lower pole moiety, similar to the prior study..               Workstation performed: [id number]         XR chest portable   ED Interpretation by Tala Morales DO (04/14 7681)   NAD      Final Interpretation by Kourtney Medina MD (04/14 5046)   No acute consolidation or congestion   Calcific tendinitis supraspinatus         Workstation  performed: [id number]           Cardiology:  ECG 12 lead    by Interface, Ris Results In (04/14 1503)          Results from last 7 days   Lab Units 04/14/25  1523   SARS-COV-2  Negative     Results from last 7 days   Lab Units 04/15/25  0500 04/14/25 1921 04/14/25  1541   WBC Thousand/uL 6.79  --  11.86*   HEMOGLOBIN g/dL 12.1  --  14.9   HEMATOCRIT % 36.3  --  46.3*   PLATELETS Thousands/uL 133* 144* 200   TOTAL NEUT ABS Thousands/µL  --   --  9.87*         Results from last 7 days   Lab Units 04/15/25  0500 04/14/25  1541   SODIUM mmol/L 138 135   POTASSIUM mmol/L 3.4* 3.6   CHLORIDE mmol/L 109* 100   CO2 mmol/L 22 25   ANION GAP mmol/L 7 10   BUN mg/dL 7 9   CREATININE mg/dL 0.55* 0.75   EGFR ml/min/1.73sq m 102 87   CALCIUM mg/dL 8.0* 9.5   MAGNESIUM mg/dL  --  1.7*     Results from last 7 days   Lab Units 04/14/25  1541   AST U/L 12*   ALT U/L 10   ALK PHOS U/L 94   TOTAL PROTEIN g/dL 7.6   ALBUMIN g/dL 4.2   TOTAL BILIRUBIN mg/dL 1.00     Results from last 7 days   Lab Units 04/14/25  1507   POC GLUCOSE mg/dl 85     Results from last 7 days   Lab Units 04/15/25  0500 04/14/25  1541   GLUCOSE RANDOM mg/dL 121 111                 Results from last 7 days   Lab Units 04/14/25  1801 04/14/25  1541   HS TNI 0HR ng/L  --  4   HS TNI 2HR ng/L 5  --    HSTNI D2 ng/L 1  --          Results from last 7 days   Lab Units 04/14/25  1541   PROTIME seconds 14.9   INR  1.09   PTT seconds 31         Results from last 7 days   Lab Units 04/15/25  0500 04/14/25  1921 04/14/25  1541   PROCALCITONIN ng/ml 1.86* 0.73* 0.38*     Results from last 7 days   Lab Units 04/14/25  1801 04/14/25  1541   LACTIC ACID mmol/L 0.6 2.9*     Results from last 7 days   Lab Units 04/14/25  1541   LIPASE u/L 14                 Results from last 7 days   Lab Units 04/14/25  1600   CLARITY UA  Turbid   COLOR UA  Yellow   SPEC GRAV UA  1.023   PH UA  6.0   GLUCOSE UA mg/dl Negative   KETONES UA mg/dl 20 (1+)*   BLOOD UA  Large*   PROTEIN UA mg/dl 70  (1+)*   NITRITE UA  Negative   BILIRUBIN UA  Negative   UROBILINOGEN UA (BE) mg/dl <2.0   LEUKOCYTES UA  Large*   WBC UA /hpf Innumerable*   RBC UA /hpf 30-50*   BACTERIA UA /hpf Innumerable*   EPITHELIAL CELLS WET PREP /hpf Occasional   MUCUS THREADS  Occasional*     Results from last 7 days   Lab Units 04/14/25  1523   INFLUENZA A PCR  Negative   INFLUENZA B PCR  Negative   RSV PCR  Negative         Results from last 7 days   Lab Units 04/14/25  1523   BLOOD CULTURE  Received in Microbiology Lab. Culture in Progress.  Received in Microbiology Lab. Culture in Progress.       History reviewed. No pertinent past medical history.  Present on Admission:   Sepsis without acute organ dysfunction (HCC)   Pyelonephritis of left kidney      Admitting Diagnosis: Pyelonephritis [N12]  Fever [R50.9]  Age/Sex: 59 y.o. female    Network Utilization Review Department  ATTENTION: Please call with any questions or concerns to [phone] and carefully listen to the prompts so that you are directed to the right person. All voicemails are confidential.   For Discharge needs, contact Care Management DC Support Team at [phone] opt. [phone]  Send all requests for admission clinical reviews, approved or denied determinations and any other requests to dedicated fax number below belonging to the campus where the patient is receiving treatment. List of dedicated fax numbers for the Facilities:  FACILITY NAME UR FAX NUMBER   ADMISSION DENIALS (Administrative/Medical Necessity) [phone]   DISCHARGE SUPPORT TEAM (NETWORK) [phone]   PARENT CHILD HEALTH (Maternity/NICU/Pediatrics) [phone]   [facility] [phone]   [facility] [phone]   [facility] [phone]   [facility] [phone]   [facility] [phone]   [facility] [phone]   formerly [facility]  [place] [phone]   [facility] [phone]   [facility] [phone]   [facility] [phone]   [facility] [phone]   [facility] [phone]

## 2025-04-15 NOTE — PLAN OF CARE
Problem: Potential for Falls  Goal: Patient will remain free of falls  Description: INTERVENTIONS:- Educate patient/family on patient safety including physical limitations- Instruct patient to call for assistance with activity - Consult OT/PT to assist with strengthening/mobility - Keep Call bell within reach- Keep bed low and locked with side rails adjusted as appropriate- Keep care items and personal belongings within reach- Initiate and maintain comfort rounds- Make Fall Risk Sign visible to staff- Offer Toileting every  Hours, in advance of need- Initiate/Maintain alarm- Obtain necessary fall risk management equipment- Apply yellow socks and bracelet for high fall risk patients- Consider moving patient to room near nurses station  Outcome: Progressing     Problem: GENITOURINARY - ADULT  Goal: Maintains or returns to baseline urinary function  Description: INTERVENTIONS:- Assess urinary function- Encourage oral fluids to ensure adequate hydration if ordered- Administer IV fluids as ordered to ensure adequate hydration- Administer ordered medications as needed- Offer frequent toileting- Follow urinary retention protocol if ordered  Outcome: Progressing  Goal: Absence of urinary retention  Description: INTERVENTIONS:- Assess patient’s ability to void and empty bladder- Monitor I/O- Bladder scan as needed- Discuss with physician/AP medications to alleviate retention as needed- Discuss catheterization for long term situations as appropriate  Outcome: Progressing  Goal: Urinary catheter remains patent  Description: INTERVENTIONS:- Assess patency of urinary catheter- If patient has a chronic oliva, consider changing catheter if non-functioning- Follow guidelines for intermittent irrigation of non-functioning urinary catheter  Outcome: Progressing     Problem: PAIN - ADULT  Goal: Verbalizes/displays adequate comfort level or baseline comfort level  Description: Interventions:- Encourage patient to monitor pain and  request assistance- Assess pain using appropriate pain scale- Administer analgesics based on type and severity of pain and evaluate response- Implement non-pharmacological measures as appropriate and evaluate response- Consider cultural and social influences on pain and pain management- Notify physician/advanced practitioner if interventions unsuccessful or patient reports new pain  Outcome: Progressing     Problem: INFECTION - ADULT  Goal: Absence or prevention of progression during hospitalization  Description: INTERVENTIONS:- Assess and monitor for signs and symptoms of infection- Monitor lab/diagnostic results- Monitor all insertion sites, i.e. indwelling lines, tubes, and drains- Monitor endotracheal if appropriate and nasal secretions for changes in amount and color- Maxie appropriate cooling/warming therapies per order- Administer medications as ordered- Instruct and encourage patient and family to use good hand hygiene technique- Identify and instruct in appropriate isolation precautions for identified infection/condition  Outcome: Progressing  Goal: Absence of fever/infection during neutropenic period  Description: INTERVENTIONS:- Monitor WBC  Outcome: Progressing     Problem: Knowledge Deficit  Goal: Patient/family/caregiver demonstrates understanding of disease process, treatment plan, medications, and discharge instructions  Description: Complete learning assessment and assess knowledge base.Interventions:- Provide teaching at level of understanding- Provide teaching via preferred learning methods  Outcome: Progressing

## 2025-04-16 PROBLEM — E87.6 HYPOKALEMIA: Status: ACTIVE | Noted: 2025-04-16

## 2025-04-16 PROBLEM — R65.20 SEVERE SEPSIS (HCC): Status: ACTIVE | Noted: 2025-04-14

## 2025-04-16 PROBLEM — E87.8 ELECTROLYTE ABNORMALITY: Status: ACTIVE | Noted: 2025-04-16

## 2025-04-16 PROBLEM — D69.6 THROMBOCYTOPENIA (HCC): Status: ACTIVE | Noted: 2025-04-16

## 2025-04-16 PROBLEM — R51.9 HEADACHE: Status: ACTIVE | Noted: 2025-04-16

## 2025-04-16 LAB
ALBUMIN SERPL BCG-MCNC: 3.1 G/DL (ref 3.5–5)
ALP SERPL-CCNC: 65 U/L (ref 34–104)
ALT SERPL W P-5'-P-CCNC: 10 U/L (ref 7–52)
ANION GAP SERPL CALCULATED.3IONS-SCNC: 6 MMOL/L (ref 4–13)
AST SERPL W P-5'-P-CCNC: 10 U/L (ref 13–39)
ATRIAL RATE: 94 BPM
BACTERIA UR CULT: ABNORMAL
BILIRUB SERPL-MCNC: 0.39 MG/DL (ref 0.2–1)
BUN SERPL-MCNC: 6 MG/DL (ref 5–25)
CALCIUM ALBUM COR SERPL-MCNC: 8.8 MG/DL (ref 8.3–10.1)
CALCIUM SERPL-MCNC: 8.1 MG/DL (ref 8.4–10.2)
CHLORIDE SERPL-SCNC: 109 MMOL/L (ref 96–108)
CO2 SERPL-SCNC: 25 MMOL/L (ref 21–32)
CREAT SERPL-MCNC: 0.52 MG/DL (ref 0.6–1.3)
ERYTHROCYTE [DISTWIDTH] IN BLOOD BY AUTOMATED COUNT: 11.6 % (ref 11.6–15.1)
GFR SERPL CREATININE-BSD FRML MDRD: 104 ML/MIN/1.73SQ M
GLUCOSE P FAST SERPL-MCNC: 117 MG/DL (ref 65–99)
GLUCOSE SERPL-MCNC: 117 MG/DL (ref 65–140)
HCT VFR BLD AUTO: 33.9 % (ref 34.8–46.1)
HGB BLD-MCNC: 11.1 G/DL (ref 11.5–15.4)
MCH RBC QN AUTO: 29.8 PG (ref 26.8–34.3)
MCHC RBC AUTO-ENTMCNC: 32.7 G/DL (ref 31.4–37.4)
MCV RBC AUTO: 91 FL (ref 82–98)
P AXIS: 70 DEGREES
PLATELET # BLD AUTO: 145 THOUSANDS/UL (ref 149–390)
PMV BLD AUTO: 9.6 FL (ref 8.9–12.7)
POTASSIUM SERPL-SCNC: 3.2 MMOL/L (ref 3.5–5.3)
PR INTERVAL: 160 MS
PROCALCITONIN SERPL-MCNC: 1.21 NG/ML
PROT SERPL-MCNC: 5.6 G/DL (ref 6.4–8.4)
QRS AXIS: 67 DEGREES
QRSD INTERVAL: 82 MS
QT INTERVAL: 340 MS
QTC INTERVAL: 425 MS
RBC # BLD AUTO: 3.73 MILLION/UL (ref 3.81–5.12)
SODIUM SERPL-SCNC: 140 MMOL/L (ref 135–147)
T WAVE AXIS: 45 DEGREES
VENTRICULAR RATE: 94 BPM
WBC # BLD AUTO: 4.74 THOUSAND/UL (ref 4.31–10.16)

## 2025-04-16 PROCEDURE — 99223 1ST HOSP IP/OBS HIGH 75: CPT

## 2025-04-16 PROCEDURE — 99232 SBSQ HOSP IP/OBS MODERATE 35: CPT | Performed by: INTERNAL MEDICINE

## 2025-04-16 PROCEDURE — 93010 ELECTROCARDIOGRAM REPORT: CPT | Performed by: INTERNAL MEDICINE

## 2025-04-16 PROCEDURE — 85027 COMPLETE CBC AUTOMATED: CPT | Performed by: INTERNAL MEDICINE

## 2025-04-16 PROCEDURE — 84145 PROCALCITONIN (PCT): CPT | Performed by: INTERNAL MEDICINE

## 2025-04-16 PROCEDURE — 80053 COMPREHEN METABOLIC PANEL: CPT | Performed by: INTERNAL MEDICINE

## 2025-04-16 RX ORDER — POTASSIUM CHLORIDE 1500 MG/1
40 TABLET, EXTENDED RELEASE ORAL EVERY 4 HOURS
Status: COMPLETED | OUTPATIENT
Start: 2025-04-16 | End: 2025-04-16

## 2025-04-16 RX ORDER — BUTALBITAL, ACETAMINOPHEN AND CAFFEINE 50; 325; 40 MG/1; MG/1; MG/1
1 TABLET ORAL EVERY 4 HOURS PRN
Status: DISCONTINUED | OUTPATIENT
Start: 2025-04-16 | End: 2025-04-18 | Stop reason: HOSPADM

## 2025-04-16 RX ORDER — ACETAMINOPHEN 325 MG/1
650 TABLET ORAL EVERY 6 HOURS PRN
Status: DISCONTINUED | OUTPATIENT
Start: 2025-04-16 | End: 2025-04-18 | Stop reason: HOSPADM

## 2025-04-16 RX ADMIN — SENNOSIDES AND DOCUSATE SODIUM 1 TABLET: 50; 8.6 TABLET ORAL at 09:25

## 2025-04-16 RX ADMIN — ACETAMINOPHEN 650 MG: 325 TABLET, FILM COATED ORAL at 15:38

## 2025-04-16 RX ADMIN — ACETAMINOPHEN 650 MG: 325 TABLET, FILM COATED ORAL at 09:25

## 2025-04-16 RX ADMIN — POTASSIUM CHLORIDE 40 MEQ: 1500 TABLET, EXTENDED RELEASE ORAL at 09:34

## 2025-04-16 RX ADMIN — SODIUM CHLORIDE, SODIUM GLUCONATE, SODIUM ACETATE, POTASSIUM CHLORIDE, MAGNESIUM CHLORIDE, SODIUM PHOSPHATE, DIBASIC, AND POTASSIUM PHOSPHATE 100 ML/HR: .53; .5; .37; .037; .03; .012; .00082 INJECTION, SOLUTION INTRAVENOUS at 10:26

## 2025-04-16 RX ADMIN — SODIUM CHLORIDE, SODIUM GLUCONATE, SODIUM ACETATE, POTASSIUM CHLORIDE, MAGNESIUM CHLORIDE, SODIUM PHOSPHATE, DIBASIC, AND POTASSIUM PHOSPHATE 100 ML/HR: .53; .5; .37; .037; .03; .012; .00082 INJECTION, SOLUTION INTRAVENOUS at 21:18

## 2025-04-16 RX ADMIN — ACETAMINOPHEN 650 MG: 325 TABLET, FILM COATED ORAL at 02:39

## 2025-04-16 RX ADMIN — POTASSIUM CHLORIDE 40 MEQ: 1500 TABLET, EXTENDED RELEASE ORAL at 13:23

## 2025-04-16 RX ADMIN — Medication 1000 MG: at 09:26

## 2025-04-16 RX ADMIN — ACETAMINOPHEN 650 MG: 325 TABLET, FILM COATED ORAL at 21:17

## 2025-04-16 RX ADMIN — SENNOSIDES AND DOCUSATE SODIUM 1 TABLET: 50; 8.6 TABLET ORAL at 17:19

## 2025-04-16 NOTE — PROGRESS NOTES
Progress Note - Hospitalist   Name: Adrienne Mendes 59 y.o. female I MRN: [id number]  Unit/Bed#: -01 I Date of Admission: 4/14/2025   Date of Service: 4/16/2025 I Hospital Day: 1    Assessment & Plan  Severe sepsis (HCC)  Presented with weakness coupled with high-grade fever/chills - evidence of lactic acidosis coupled with tachycardia/leukocytosis in the ED   Secondary to left-sided pyelonephritis (see below)  Blood cultures remain negative thus far  Lactic acidosis normalized status post IV fluids  Procalcitonin peak of 1.86 -> downtrended to 1.21  Monitor vitals and maintain hemodynamics -> goal MAP > 65  Pyelonephritis of left kidney  Continue IV Rocephin   Urine culture growing E. coli  PRN pain/emesis control  Appreciate urology input -> recommend avoidance of calcium/vitamin D supplements in the setting of prior history of hypercalcemia with calcifications noted on prior CT imaging - outpatient urology follow-up  Headache  PRN Fioricet on board  CT of head negative for acute intracranial etiology  Electrolyte abnormalities  Monitor/replete serum potassium and magnesium, as necessary  Thrombocytopenia (HCC)  Monitor platelet count - monitor for bleeding      VTE Pharmacologic Prophylaxis: VTE Score: 4 Moderate Risk (Score 3-4) - Pharmacological DVT Prophylaxis Ordered: Heparin.    AM-PAC Basic Mobility:  Basic Mobility Inpatient Raw Score: 22  JH-HLM Goal: 7: Walk 25 feet or more  JH-HLM Achieved: 7: Walk 25 feet or more  JH-HLM Goal achieved. Continue to encourage appropriate mobility.    Patient Centered Rounds:  I have performed bedside rounds and discussed plan of care with nursing today.  Discussions with Specialists or Other Care Team Provider:  see above assessments if applicable    Education and Discussions with Family / Patient:  Patient at bedside, who will self be contacts, as necessary    Time Spent for Care:  35 minutes. More than 50% of total time spent on counseling and coordination of  care, on one or more of the following: performing physical exam; counseling and coordination of care, obtaining or reviewing history, documenting in the medical record, reviewing/ordering tests/medications/procedures, and communicating with other healthcare professionals.    Current Length of Stay: 1 day(s)  Current Patient Status: Inpatient   Certification Statement:  Patient will continue to require additional hospital stay due to assessments as noted above.    Code Status: Level 1 - Full Code      Subjective     Encountered earlier today.  Sitting upright in the chair during my encounter.  Still complains of intermittent headaches but denies abdominal/flank discomfort at this time.  Reports resolution of nausea.      Objective     Vitals:   Temp (24hrs), Av.7 °F (37.1 °C), Min:98.1 °F (36.7 °C), Max:99.8 °F (37.7 °C)    Temp:  [98.1 °F (36.7 °C)-99.8 °F (37.7 °C)] 98.5 °F (36.9 °C)  HR:  [70-86] 70  Resp:  [18] 18  BP: (103-115)/(65-71) 106/65  SpO2:  [94 %-98 %] 98 %  Body mass index is 28.34 kg/m².     Input and Output Summary (last 24 hours):       Intake/Output Summary (Last 24 hours) at 2025 1619  Last data filed at 2025 0449  Gross per 24 hour   Intake 600 ml   Output --   Net 600 ml       Physical Exam:     GENERAL Waxing/waning distress due to pain   HEAD   Normocephalic - atraumatic   EYES Nonicteric   MOUTH   Mucosa moist   NECK   Supple - full range of motion   CARDIAC Rate controlled   PULMONARY Fairly clear to auscultation   ABDOMEN Currently nontender/nondistended - no active costovertebral tenderness   MUSCULOSKELETAL   Motor strength/range of motion intact   NEUROLOGIC   Alert/oriented at baseline   PSYCHIATRIC   Mood/affect stable         Labs & Recent Cultures:  Results from last 7 days   Lab Units 25  0449 25  1921 25  1541   WBC Thousand/uL 4.74   < > 11.86*   HEMOGLOBIN g/dL 11.1*   < > 14.9   HEMATOCRIT % 33.9*   < > 46.3*   PLATELETS Thousands/uL 145*   < >  200   SEGS PCT %  --   --  84*   LYMPHO PCT %  --   --  11*   MONO PCT %  --   --  5   EOS PCT %  --   --  0    < > = values in this interval not displayed.     Results from last 7 days   Lab Units 04/16/25  0449   POTASSIUM mmol/L 3.2*   CHLORIDE mmol/L 109*   CO2 mmol/L 25   BUN mg/dL 6   CREATININE mg/dL 0.52*   CALCIUM mg/dL 8.1*   ALK PHOS U/L 65   ALT U/L 10   AST U/L 10*     Results from last 7 days   Lab Units 04/14/25  1541   INR  1.09     Results from last 7 days   Lab Units 04/14/25  1507   POC GLUCOSE mg/dl 85         Results from last 7 days   Lab Units 04/16/25  0449 04/15/25  0500 04/14/25  1921 04/14/25  1801 04/14/25  1541   LACTIC ACID mmol/L  --   --   --  0.6 2.9*   PROCALCITONIN ng/ml 1.21* 1.86* 0.73*  --  0.38*         Results from last 7 days   Lab Units 04/14/25  1600 04/14/25  1523   BLOOD CULTURE   --  No Growth at 24 hrs.  No Growth at 24 hrs.   URINE CULTURE  >100,000 cfu/ml Escherichia coli*  --          Lines/Drains/Telemetry:  Invasive Devices       Peripheral Intravenous Line  Duration             Peripheral IV 04/14/25 Distal;Left;Ventral (anterior) Forearm 1 day                    Last 24 Hours Medication List:   Current Facility-Administered Medications   Medication Dose Route Frequency Provider Last Rate    acetaminophen  650 mg Oral Q6H PRN Maryann De Santiago MD      butalbital-acetaminophen-caffeine  1 tablet Oral Q4H PRN Maryann De Santiago MD      cefTRIAXone  1,000 mg Intravenous Q24H Riley Aguilar MD 1,000 mg (04/16/25 0926)    heparin (porcine)  5,000 Units Subcutaneous Q8H [facility] Riley Aguilar MD      multi-electrolyte  100 mL/hr Intravenous Continuous Maryann De Santiago  mL/hr (04/16/25 1026)    ondansetron  4 mg Intravenous Q6H PRN Ina Donald PA-C      oxyCODONE  5 mg Oral Once Mark Mcfarland MD      senna-docusate sodium  1 tablet Oral BID MD MARYANN Zhou MD   Hospitalist - [facility] Internal Medicine        ** Please Note:   Documentation is constructed using a voice recognition dictation system.  An occasional wrong word/phrase or “sound-a-like” substitution may have been picked up by dictation device due to the inherent limitations of voice recognition software.  Read the chart carefully and recognize, using reasonable context, where substitutions may have occurred.**

## 2025-04-16 NOTE — CONSULTS
Consultation - Urology   Name: Adrienne Mendes 59 y.o. female I MRN: [id number]  Unit/Bed#: -01 I Date of Admission: 4/14/2025   Date of Service: 4/16/2025 I Hospital Day: 1   Inpatient consult to Urology  Consult performed by: Skylar Dale PA-C  Consult ordered by: Mark Mcfarland MD        Physician Requesting Evaluation: Maryann De Santiago MD   Reason for Evaluation / Principal Problem: Pyelonephritis    Assessment & Plan  Sepsis without acute organ dysfunction (HCC)  Presenting with fever, chills, weakness, headache.  CVA tenderness on the right side on admission  WBC 12 on admission  Elevated lactic on admission increased Pro-Josef  CT scan which showed a duplicated right collecting system the right lower pole is atrophic and has coarse calcifications and nonobstructing calculi and scarring.  The left kidney shows an area of heterogeneous enhancement likely due to pyelonephritis.  There is no fluid collection.  Urine culture E. 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  Recommended to avoid multivitamin and calcium supplementation.  She is going to repeat the 24-hour urine collection in the next few weeks, if this is persistently elevated nephrology may consider HCTZ  CT scan with Right lower pole moiety atrophic and associated with multiple calcifications.  Less calcifications compared to her CT renal protocol from 2024.  Can consider further workup in outpatient setting such as renal lasix scan. Right lower portion of kidney is atrophic and with scarring/calcifications.   Outpatient follow up to be arranged.   Urology will sign off but remain available for any further inpatient needs. Please feel free to contact the provider currently covering the Urology [facility] role for this [place] with questions or concerns.     Pyelonephritis of left kidney  Plan under  sepsis  Treatment with culture directed antibiotics for 10 to 14 days        Subjective:   HPI: Adrienne is a 59-year-old female with past medical history of kidney stones who presented to the ED with high-grade fever, generalized weakness, headache, chills.    Patient reports onset of bodyaches on the 12th and developed a high fever of 102 on the 13th.  She presented to the ED on the 14th due to persistent of symptoms, associated symptoms include nausea, dry heaves, headaches.  She denies any urinary symptoms including frequency, urgency, dysuria, hematuria.  She reports she has had 3 episodes of pyelonephritis over the course of her life.      She has a history of nephrolithiasis and has had 2 previous ureteroscopic interventions.  She is to follow with a urologist in [place] however relocated to the area 3 years ago and wants to establish with urology in the area.     She did follow-up with nephrology due to a history of recurrent nephrolithiasis she underwent a 24-hour urine collection, which showed hypercalciuria.  She was found to have elevated urine sodium and encouraged to have low-salt diet.  She was encouraged to avoid multivitamin and calcium supplementation.  She is scheduled to undergo 24 urine collection in the next few months for follow-up.  If persistent elevated hypercalciuria, additional treatment such as HCTZ may be recommended per nephrology.    In the ED she underwent a CT scan which showed a duplicated right collecting system the right lower pole is atrophic and has coarse calcifications and nonobstructing calculi and scarring.  The left kidney shows an area of heterogeneous enhancement likely due to pyelonephritis.  There is no fluid collection.  We reviewed her CT scan and showed R lower pole atrophy and calcifications. She does not have any further back back or CVA tenderness. She reports headache is worse today. Urology was consulted due to pyelonephritis.               Review of Systems  "  Constitutional:  Negative for chills and fever.   Respiratory:  Negative for cough and shortness of breath.    Cardiovascular:  Negative for chest pain and palpitations.   Gastrointestinal:  Negative for abdominal pain and vomiting.   Genitourinary:  Negative for dysuria and hematuria.   Musculoskeletal:  Negative for arthralgias and back pain.   Skin:  Negative for color change and rash.   Neurological:  Positive for headaches. Negative for seizures and syncope.   All other systems reviewed and are negative.      Objective:    Vitals: Blood pressure 103/65, pulse 75, temperature 98.1 °F (36.7 °C), temperature source Oral, resp. rate 18, height 5' 1\" (1.549 m), weight 68 kg (150 lb), SpO2 94%.,Body mass index is 28.34 kg/m².    Physical Exam  Vitals reviewed.   Constitutional:       General: She is not in acute distress.     Appearance: Normal appearance. She is normal weight. She is not ill-appearing, toxic-appearing or diaphoretic.   HENT:      Head: Normocephalic and atraumatic.      Right Ear: External ear normal.      Left Ear: External ear normal.      Nose: Nose normal.      Mouth/Throat:      Mouth: Mucous membranes are moist.   Eyes:      General: No scleral icterus.     Conjunctiva/sclera: Conjunctivae normal.   Cardiovascular:      Rate and Rhythm: Normal rate.      Pulses: Normal pulses.   Pulmonary:      Effort: Pulmonary effort is normal.   Abdominal:      General: There is no distension.      Tenderness: There is no abdominal tenderness. There is no right CVA tenderness, left CVA tenderness or guarding.   Neurological:      General: No focal deficit present.      Mental Status: She is alert and oriented to person, place, and time. Mental status is at baseline.   Psychiatric:         Mood and Affect: Mood normal.         Behavior: Behavior normal.         Thought Content: Thought content normal.         Judgment: Judgment normal.         Imaging:    CT CHEST, ABDOMEN AND PELVIS WITH IV CONTRAST   "   INDICATION: fever.     COMPARISON: 6/21/2024     TECHNIQUE: CT examination of the chest, abdomen and pelvis was performed. Multiplanar 2D reformatted images were created from the source data.     This examination, like all CT scans performed in the [facility], was performed utilizing techniques to minimize radiation dose exposure, including the use of iterative reconstruction and automated exposure control. Radiation dose length   product (DLP) for this visit: 620 mGy-cm     IV Contrast: 100 mL of iohexol  Enteric Contrast: Not administered.     FINDINGS:     CHEST     LUNGS: Mild bibasilar strandy densities most likely subsegmental atelectasis.     PLEURA: Unremarkable.     HEART/GREAT VESSELS: Heart is unremarkable for patient's age. No thoracic aortic aneurysm. Mild scattered atherosclerotic aortic calcifications noted.     MEDIASTINUM AND CATHERINE: Unremarkable.     CHEST WALL AND LOWER NECK: The right lobe of the thyroid gland is not identified, correlate with history of partial thyroidectomy.     ABDOMEN     LIVER/BILIARY TREE: Subcentimeter hypoattenuating lesion(s), too small to characterize but statistically likely benign, which do not require follow-up (ACR White Paper 2017). No suspicious mass. There are also a few scattered hepatic cysts, the largest   in the right lobe of the liver, measuring 3.6 cm. Normal hepatic contours. No biliary dilation.     GALLBLADDER: No calcified gallstones. No pericholecystic inflammatory change.     SPLEEN: Unremarkable.     PANCREAS: Unremarkable.     ADRENAL GLANDS: Unremarkable.     KIDNEYS/URETERS: Probable duplicated right renal collecting system. The lower pole moiety of the right kidney is atrophic and demonstrates coarse calcifications possibly nonobstructing calculi and scarring. Numerous nonobstructing intrarenal calculi   noted. Scattered renal hypodensities noted, some too small to characterize within the left kidney there is a indeterminant  ovoid hypodense region measuring 2.1 x 1.2 cm, demonstrating heterogeneous enhancement compared to the other normal parenchymal   enhancement, new from the prior study, correlating finding on series 602, image 68.     No hydronephrosis.     STOMACH AND BOWEL: Unremarkable.     APPENDIX: No findings to suggest appendicitis.     ABDOMINOPELVIC CAVITY: No ascites. No pneumoperitoneum. No lymphadenopathy.     VESSELS: Unremarkable for patient's age.     PELVIS     REPRODUCTIVE ORGANS: Unremarkable for patient's age.     URINARY BLADDER: Unremarkable.     ABDOMINAL WALL/INGUINAL REGIONS: Intact.  BONES: Scattered spondylotic changes noted. Multiple Schmorl's nodes noted. There is new fragmentation and sclerosis in the superior endplate of T11, series 602, image 108. Adjacent vacuum phenomenon at T10-T11 intervertebral disc space. The inferior   endplate of T10 is maintained. Vacuum phenomenon elsewhere at L2-3, L4-5 and L5-S1.        IMPRESSION:     1.  New 2.1 cm heterogeneously enhancing lesion in the left kidney (series 602, image 68). Differential diagnosis includes focal pyelonephritis. Other mass lesions not excluded. Further clinical assessment and follow-up advised.  2.  New fragmentation and sclerosis in the superior endplate of T11 possibly related to Schmorl's node. Further clinical assessment advised.  3.  Duplicated right renal collecting system with atrophy and scarring of the lower pole moiety, similar to the prior study..              Workstation performed: [id number]  Labs:  Recent Labs     04/14/25  1541 04/15/25  0500 04/16/25  0449   WBC 11.86* 6.79 4.74       Recent Labs     04/14/25  1541 04/15/25  0500 04/16/25  0449   HGB 14.9 12.1 11.1*     Recent Labs     04/14/25  1541 04/15/25  0500 04/16/25  0449   HCT 46.3* 36.3 33.9*     Recent Labs     04/14/25  1541 04/15/25  0500 04/16/25  0449   CREATININE 0.75 0.55* 0.52*         History:  History reviewed. No pertinent past medical history.  Social  History     Socioeconomic History    Marital status:      Spouse name: None    Number of children: None    Years of education: None    Highest education level: None   Occupational History    None   Tobacco Use    Smoking status: Never     Passive exposure: Never    Smokeless tobacco: Never   Substance and Sexual Activity    Alcohol use: Not Currently    Drug use: Never    Sexual activity: None   Other Topics Concern    None   Social History Narrative    None     Social Drivers of Health     Financial Resource Strain: Not on file   Food Insecurity: No Food Insecurity (2025)    Nursing - Inadequate Food Risk Classification     Worried About Running Out of Food in the Last Year: Not on file     Ran Out of Food in the Last Year: Not on file     Ran Out of Food in the Last Year: Never true   Transportation Needs: No Transportation Needs (2025)    Nursing - Transportation Risk Classification     Lack of Transportation: Not on file     Lack of Transportation: No   Physical Activity: Not on file   Stress: Not on file   Social Connections: Unknown (2024)    Received from [facility]    Social [employer]     How often do you feel lonely or isolated from those around you? (Adult - for ages 18 years and over): Not on file   Intimate Partner Violence: Unknown (2025)    Nursing IPS     Feels Physically and Emotionally Safe: Not on file     Physically Hurt by Someone: Not on file     Humiliated or Emotionally Abused by Someone: Not on file     Physically Hurt by Someone: No     Hurt or Threatened by Someone: No   Housing Stability: Unknown (2025)    Nursing: Inadequate Housing Risk Classification     Has Housing: Not on file     Worried About Losing Housing: Not on file     Unable to Get Utilities: Not on file     Unable to Pay for Housing in the Last Year: No     Has Housin     Past Surgical History:   Procedure Laterality Date    PARTIAL HYSTERECTOMY      THYROIDECTOMY       Family History    Problem Relation Age of Onset    No Known Problems Mother     Liver cancer Father        Skylar Dale PA-C  Date: 4/16/2025 Time: 8:43 AM

## 2025-04-16 NOTE — CASE MANAGEMENT
Case Management Progress Note    Patient name Adrienne Mendes  Location /-01 MRN [id number]  : 1965 Date 2025       LOS (days): 1  Geometric Mean LOS (GMLOS) (days):   Days to GMLOS:        OBJECTIVE:        Current admission status: Inpatient  Preferred Pharmacy:   [facility]/pharmacy #[id number] - [facility] [address] [place] [id number]  Phone: [phone] Fax: [phone]    Primary Care Provider: VIKTORIA Willis    Primary Insurance: [employer]  Secondary Insurance:     PROGRESS NOTE:    CM reviwed chart and discussed case in SLIM rounds.  No identified needs for CM intervention.    Dx-Pyelonephritis of left kidney  Continue IV Rocephin   Urine culture growing E. coli    Urology consult completed and signed off- out patient f/u    Patient identified to have active insurance,, family support system , drives and [facility]    Plan CM will continue to monitor throughout course of hospitalization for potential CM needs.

## 2025-04-16 NOTE — PLAN OF CARE
Problem: Potential for Falls  Goal: Patient will remain free of falls  Description: INTERVENTIONS:- Educate patient/family on patient safety including physical limitations- Instruct patient to call for assistance with activity - Consult OT/PT to assist with strengthening/mobility - Keep Call bell within reach- Keep bed low and locked with side rails adjusted as appropriate- Keep care items and personal belongings within reach- Initiate and maintain comfort rounds- Make Fall Risk Sign visible to staff- Offer Toileting every  Hours, in advance of need- Initiate/Maintain alarm- Obtain necessary fall risk management equipment: - Apply yellow socks and bracelet for high fall risk patients- Consider moving patient to room near nurses station  Outcome: Progressing     Problem: GENITOURINARY - ADULT  Goal: Maintains or returns to baseline urinary function  Description: INTERVENTIONS:- Assess urinary function- Encourage oral fluids to ensure adequate hydration if ordered- Administer IV fluids as ordered to ensure adequate hydration- Administer ordered medications as needed- Offer frequent toileting- Follow urinary retention protocol if ordered  Outcome: Progressing  Goal: Absence of urinary retention  Description: INTERVENTIONS:- Assess patient’s ability to void and empty bladder- Monitor I/O- Bladder scan as needed- Discuss with physician/AP medications to alleviate retention as needed- Discuss catheterization for long term situations as appropriate  Outcome: Progressing  Goal: Urinary catheter remains patent  Description: INTERVENTIONS:- Assess patency of urinary catheter- If patient has a chronic oliva, consider changing catheter if non-functioning- Follow guidelines for intermittent irrigation of non-functioning urinary catheter  Outcome: Progressing     Problem: PAIN - ADULT  Goal: Verbalizes/displays adequate comfort level or baseline comfort level  Description: Interventions:- Encourage patient to monitor pain and  request assistance- Assess pain using appropriate pain scale- Administer analgesics based on type and severity of pain and evaluate response- Implement non-pharmacological measures as appropriate and evaluate response- Consider cultural and social influences on pain and pain management- Notify physician/advanced practitioner if interventions unsuccessful or patient reports new pain  Outcome: Progressing     Problem: INFECTION - ADULT  Goal: Absence or prevention of progression during hospitalization  Description: INTERVENTIONS:- Assess and monitor for signs and symptoms of infection- Monitor lab/diagnostic results- Monitor all insertion sites, i.e. indwelling lines, tubes, and drains- Monitor endotracheal if appropriate and nasal secretions for changes in amount and color- [place] appropriate cooling/warming therapies per order- Administer medications as ordered- Instruct and encourage patient and family to use good hand hygiene technique- Identify and instruct in appropriate isolation precautions for identified infection/condition  Outcome: Progressing  Goal: Absence of fever/infection during neutropenic period  Description: INTERVENTIONS:- Monitor WBC  Outcome: Progressing     Problem: Knowledge Deficit  Goal: Patient/family/caregiver demonstrates understanding of disease process, treatment plan, medications, and discharge instructions  Description: Complete learning assessment and assess knowledge base.Interventions:- Provide teaching at level of understanding- Provide teaching via preferred learning methods  Outcome: Progressing

## 2025-04-16 NOTE — ASSESSMENT & PLAN NOTE
Continue IV Rocephin   Urine culture growing E. coli  PRN pain/emesis control  Appreciate urology input -> recommend avoidance of calcium/vitamin D supplements in the setting of prior history of hypercalcemia with calcifications noted on prior CT imaging - outpatient urology follow-up

## 2025-04-16 NOTE — UTILIZATION REVIEW
NOTIFICATION OF OBSERVATION ADMISSION   AUTHORIZATION REQUEST   SERVICING FACILITY:   [place] [address]  Tax ID: [id number]  NPI: [id number] ATTENDING PROVIDER:  Attending Name and NPI#: Maryann De Santiago Md [[id number]]  Address: [address] [place] [address]  Phone: [phone]     ADMISSION INFORMATION:  Place of Service: On [facility]  Place of Service Code: 22 CPT Code:   Admitting Diagnosis Code/Description:  Pyelonephritis [N12]  Fever [R50.9]  Observation Admission Date/Time: 04/14/2025 1917  Discharge Date/Time: No discharge date for patient encounter.     UTILIZATION REVIEW CONTACT:  Saskia Escoto Utilization   Network Utilization Review Department  Phone: [phone]  Fax [phone]  Email: [email or web address]  Contact for approvals/pending authorizations, clinical reviews, and discharge.     PHYSICIAN ADVISORY SERVICES:  Medical Necessity Denial & Hokq-sd-Dmzj Review  Phone: [phone]  Fax: [phone]  Email: [email or web address]     DISCHARGE SUPPORT TEAM:  For Patients Discharge Needs & Updates  Phone: [phone] opt. [phone] Fax: [phone]  Email: [email or web address]

## 2025-04-16 NOTE — ASSESSMENT & PLAN NOTE
Presenting with fever, chills, weakness, headache.  CVA tenderness on the right side on admission  WBC 12 on admission  Elevated lactic on admission increased Pro-Josef  CT scan which showed a duplicated right collecting system the right lower pole is atrophic and has coarse calcifications and nonobstructing calculi and scarring.  The left kidney shows an area of heterogeneous enhancement likely due to pyelonephritis.  There is no fluid collection.  Urine culture E. 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  Recommended to avoid multivitamin and calcium supplementation.  She is going to repeat the 24-hour urine collection in the next few weeks, if this is persistently elevated nephrology may consider HCTZ  CT scan with Right lower pole moiety atrophic and associated with multiple calcifications.  Less calcifications compared to her CT renal protocol from 2024.  Can consider further workup in outpatient setting such as renal lasix scan. Right lower portion of kidney is atrophic and with scarring/calcifications.   Outpatient follow up to be arranged.   Urology will sign off but remain available for any further inpatient needs. Please feel free to contact the provider currently covering the Urology [facility] role for this campus with questions or concerns.

## 2025-04-16 NOTE — UTILIZATION REVIEW
Initial Clinical Review - Continued    SEE INITIAL REVIEW AT BOTTOM    Date: 4/16, 4/17                         Admission Orders (From admission, onward)       Ordered        04/16/25 1314  INPATIENT ADMISSION  Once            04/14/25 1917  Place in Observation  Once                          Orders Placed This Encounter   Procedures    INPATIENT ADMISSION     Standing Status:   Standing     Number of Occurrences:   1     Level of Care:   Med Surg [16]     Estimated length of stay:   More than 2 Midnights     Certification:   I certify that inpatient services are medically necessary for this patient for a duration of greater than two midnights. See H&P and MD Progress Notes for additional information about the patient's course of treatment.         Current Patient Class: Inpatient  Current Level of Care: Med/Surg    HPI:59 y.o. female initially admitted on 4/14/2025 as OBS, changed to inpatient on 4/16 for Severe sepsis (HCC), L pyelonephritis.      Assessment/Plan:   4/16 Changed to Inpatient Status: Pt reports intermittent headaches. Notes nausea resolved. Exam: waxing/waning distress s/t pain. Procal 1.21. Urine culture: E.coli. Plan: follow cultures, MAP > 65, IV Rocephin, PRN fiorecet. Trend labs, replete electrolytes as needed. Trend Plt, monitor for bleeding.     Urology: Pt w/ elevated lactic and procal. CT w/ pyelonephritis. Urine culture + E.coli, sensitivities pending. Outpatient 24hr urine collection.     4/17 Day 2: Pt reports headache improved. Denies fevers, chills, flank pain. WBC 3.97. Plan: follow blood cultures, continue IV ABX, continue current meds, Trend labs, replete electrolytes as needed. Supportive care.      Medications:   Scheduled Medications:  cefTRIAXone, 1,000 mg, Intravenous, Q24H  heparin (porcine), 5,000 Units, Subcutaneous, Q8H DAKSHA  oxyCODONE, 5 mg, Oral, Once  senna-docusate sodium, 1 tablet, Oral, BID    Continuous IV Infusions:  multi-electrolyte, 100 mL/hr, Intravenous,  Continuous; Start: 04/15/25 1315 End: 04/17/25 0508     PRN Meds:  acetaminophen, 650 mg, Oral, Q6H PRN' 4/16 x4; 4/17 x1  butalbital-acetaminophen-caffeine, 1 tablet, Oral, Q4H PRN  ondansetron, 4 mg, Intravenous, Q6H PRN  potassium chloride, 40 mEq, Oral; 4/16 x2      Vital Signs:   Vital Signs (last 3 days)       Date/Time Temp Pulse Resp BP MAP (mmHg) SpO2 O2 Device Patient Position - Orthostatic VS Breanne Coma Scale Score Pain    04/17/25 07:27:23 98.4 °F (36.9 °C) 74 18 104/63 77 98 % None (Room air) Lying -- --    04/17/25 0359 -- -- -- -- -- -- -- -- -- 9    04/17/25 0122 -- -- -- -- -- 92 % None (Room air) -- 15 3    04/16/25 23:04:11 98.9 °F (37.2 °C) 70 -- 107/66 80 99 % -- -- -- --    04/16/25 2117 -- -- -- -- -- -- -- -- -- 3    04/16/25 1538 -- -- -- -- -- -- -- -- -- 7    04/16/25 15:28:48 98.5 °F (36.9 °C) 70 -- 106/65 79 98 % -- -- -- --    04/16/25 0925 -- -- -- -- -- -- -- -- -- 10 - Worst Possible Pain    04/16/25 0846 -- -- -- -- -- -- -- -- 15 --    04/16/25 07:20:45 98.1 °F (36.7 °C) 75 18 103/65 78 94 % None (Room air) Lying -- --    04/16/25 0239 -- -- -- -- -- -- -- -- -- 10 - Worst Possible Pain    04/16/25 02:37:35 98.4 °F (36.9 °C) 86 -- 114/71 85 96 % -- -- -- --    04/15/25 21:27:10 98.6 °F (37 °C) 76 -- 115/71 86 95 % -- -- -- --    04/15/25 2014 -- -- -- -- -- -- -- -- -- 10 - Worst Possible Pain    04/15/25 19:49:51 99 °F (37.2 °C) 82 -- 115/71 86 96 % -- -- -- --    04/15/25 1945 -- -- -- -- -- -- -- -- 15 --    04/15/25 16:39:28 99.8 °F (37.7 °C) 76 -- -- -- 97 % -- -- -- --    04/15/25 14:43:06 102.8 °F (39.3 °C) 94 18 109/70 83 95 % None (Room air) Lying -- --    04/15/25 1415 -- -- -- -- -- -- -- -- -- 4    04/15/25 12:19:47 99.2 °F (37.3 °C) 91 -- -- -- 96 % -- -- -- --    04/15/25 0935 -- 74 -- 109/65 80 97 % None (Room air) -- 15 8    04/15/25 09:28:13 -- 71 -- 116/67 83 99 % -- -- -- --    04/15/25 09:24:51 -- 72 -- 114/69 84 99 % -- -- -- --    04/15/25 09:09:15 -- 79 --  95/53 67 95 % -- -- -- --    04/15/25 07:28:45 99 °F (37.2 °C) 80 19 96/53 67 95 % None (Room air) Lying -- --    04/15/25 05:50:23 99.3 °F (37.4 °C) 81 -- 114/88 97 94 % -- -- -- --    04/15/25 0528 -- -- -- -- -- -- -- -- -- 8    04/15/25 0403 -- -- -- -- -- -- -- -- -- 8    04/15/25 03:25:26 99.3 °F (37.4 °C) 90 -- 102/66 78 99 % -- -- -- --    04/14/25 2154 -- -- -- -- -- -- -- -- -- 5 04/14/25 21:52:03 99 °F (37.2 °C) 77 -- 102/62 75 100 % -- -- -- --    04/14/25 2006 -- -- -- -- -- -- -- -- -- No Pain    04/14/25 20:04:06 99 °F (37.2 °C) 83 -- 103/62 76 96 % -- -- -- --    04/14/25 2000 -- -- -- -- -- -- -- -- 15 No Pain    04/14/25 1909 -- 89 17 110/63 80 97 % -- -- -- --    04/14/25 1900 -- 80 13 90/51 67 95 % -- -- -- --    04/14/25 1853 -- -- -- 82/50 -- -- -- -- -- --    04/14/25 1845 98.2 °F (36.8 °C) 80 17 88/51 65 96 % None (Room air) Lying -- 5 04/14/25 1830 -- 86 17 103/57 74 96 % None (Room air) Lying -- 5 04/14/25 1815 -- 86 19 102/57 75 96 % None (Room air) Lying -- 5 04/14/25 1800 -- 83 18 100/59 77 95 % None (Room air) Lying -- No Pain    04/14/25 1745 -- 87 18 101/54 74 96 % None (Room air) Lying -- 5 04/14/25 1730 -- 86 17 101/55 75 95 % None (Room air) Lying -- 5 04/14/25 1729 -- -- -- -- -- -- -- -- -- 5 04/14/25 1700 -- 91 17 94/45 65 95 % None (Room air) Lying -- 8 04/14/25 1645 -- 107 17 93/47  68 97 % None (Room air) Lying -- 8 04/14/25 1600 -- 101 17 108/53 77 97 % None (Room air) Lying -- 5 04/14/25 1444 -- -- -- -- -- -- None (Room air) -- -- --    04/14/25 1433 97.9 °F (36.6 °C) 102 23 132/71 -- 99 % None (Room air) Sitting -- --    04/14/25 1432 97.9 °F (36.6 °C) 101 17 132/71 -- 99 % None (Room air) Sitting 15 5            Pertinent Labs/Diagnostic Results:   Radiology:  CT head wo contrast   Final Interpretation by Jeremiah Flores MD (04/15 0657)      No significant abnormality evident.         Jeremiah Flores M.D., FACR   Senior Member, [employer]                  Workstation performed: [id number]         CT chest abdomen pelvis w contrast   Final Interpretation by Constantin Medrano MD (04/14 1720)      1.  New 2.1 cm heterogeneously enhancing lesion in the left kidney (series 602, image 68). Differential diagnosis includes focal pyelonephritis. Other mass lesions not excluded. Further clinical assessment and follow-up advised.   2.  New fragmentation and sclerosis in the superior endplate of T11 possibly related to Schmorl's node. Further clinical assessment advised.   3.  Duplicated right renal collecting system with atrophy and scarring of the lower pole moiety, similar to the prior study..               Workstation performed: [id number]         XR chest portable   ED Interpretation by Tala Morales DO (04/14 1550)   NAD      Final Interpretation by Kourtney Medina MD (04/14 1626)   No acute consolidation or congestion   Calcific tendinitis supraspinatus         Workstation performed: [id number]           Cardiology:  ECG 12 lead   Final Result by Emily Garcia MD (04/16 8382)   Normal sinus rhythm   No previous ECGs available   Confirmed by Emily Garcia ([id number]) on 4/16/2025 8:50:30 AM           Results from last 7 days   Lab Units 04/14/25  1523   SARS-COV-2  Negative     Results from last 7 days   Lab Units 04/17/25  0525 04/16/25  0449 04/15/25  0500 04/14/25  1921 04/14/25  1541   WBC Thousand/uL 3.97* 4.74 6.79  --  11.86*   HEMOGLOBIN g/dL 12.4 11.1* 12.1  --  14.9   HEMATOCRIT % 36.9 33.9* 36.3  --  46.3*   PLATELETS Thousands/uL 173 145* 133*   < > 200   TOTAL NEUT ABS Thousands/µL 2.16  --   --   --  9.87*    < > = values in this interval not displayed.         Results from last 7 days   Lab Units 04/17/25  0525 04/16/25  0449 04/15/25  0500 04/14/25  1541   SODIUM mmol/L 140 140 138 135   POTASSIUM mmol/L 3.9 3.2* 3.4* 3.6   CHLORIDE mmol/L 109* 109* 109* 100   CO2 mmol/L 25 25 22 25   ANION GAP mmol/L  6 6 7 10   BUN mg/dL 5 6 7 9   CREATININE mg/dL 0.54* 0.52* 0.55* 0.75   EGFR ml/min/1.73sq m 103 104 102 87   CALCIUM mg/dL 8.7 8.1* 8.0* 9.5   MAGNESIUM mg/dL 2.0  --   --  1.7*     Results from last 7 days   Lab Units 04/16/25  0449 04/14/25  1541   AST U/L 10* 12*   ALT U/L 10 10   ALK PHOS U/L 65 94   TOTAL PROTEIN g/dL 5.6* 7.6   ALBUMIN g/dL 3.1* 4.2   TOTAL BILIRUBIN mg/dL 0.39 1.00     Results from last 7 days   Lab Units 04/14/25  1507   POC GLUCOSE mg/dl 85     Results from last 7 days   Lab Units 04/17/25  0525 04/16/25  0449 04/15/25  0500 04/14/25  1541   GLUCOSE RANDOM mg/dL 106 117 121 111      Results from last 7 days   Lab Units 04/14/25  1801 04/14/25  1541   HS TNI 0HR ng/L  --  4   HS TNI 2HR ng/L 5  --    HSTNI D2 ng/L 1  --          Results from last 7 days   Lab Units 04/14/25  1541   PROTIME seconds 14.9   INR  1.09   PTT seconds 31         Results from last 7 days   Lab Units 04/16/25  0449 04/15/25  0500 04/14/25  1921 04/14/25  1541   PROCALCITONIN ng/ml 1.21* 1.86* 0.73* 0.38*     Results from last 7 days   Lab Units 04/14/25  1801 04/14/25  1541   LACTIC ACID mmol/L 0.6 2.9*      Results from last 7 days   Lab Units 04/14/25  1541   LIPASE u/L 14      Results from last 7 days   Lab Units 04/14/25  1600   CLARITY UA  Turbid   COLOR UA  Yellow   SPEC GRAV UA  1.023   PH UA  6.0   GLUCOSE UA mg/dl Negative   KETONES UA mg/dl 20 (1+)*   BLOOD UA  Large*   PROTEIN UA mg/dl 70 (1+)*   NITRITE UA  Negative   BILIRUBIN UA  Negative   UROBILINOGEN UA (BE) mg/dl <2.0   LEUKOCYTES UA  Large*   WBC UA /hpf Innumerable*   RBC UA /hpf 30-50*   BACTERIA UA /hpf Innumerable*   EPITHELIAL CELLS WET PREP /hpf Occasional   MUCUS THREADS  Occasional*     Results from last 7 days   Lab Units 04/14/25  1523   INFLUENZA A PCR  Negative   INFLUENZA B PCR  Negative   RSV PCR  Negative          Results from last 7 days   Lab Units 04/14/25  1600 04/14/25  1523   BLOOD CULTURE   --  No Growth at 48 hrs.  No  Growth at 48 hrs.   URINE CULTURE  >100,000 cfu/ml Escherichia coli*  --         Network Utilization Review Department  ATTENTION: Please call with any questions or concerns to [phone] and carefully listen to the prompts so that you are directed to the right person. All voicemails are confidential.   For Discharge needs, contact Care Management DC Support Team at [phone] opt. 2  Send all requests for admission clinical reviews, approved or denied determinations and any other requests to dedicated fax number below belonging to the campus where the patient is receiving treatment. List of dedicated fax numbers for the Facilities:  FACILITY NAME UR FAX NUMBER   ADMISSION DENIALS (Administrative/Medical Necessity) [phone]   DISCHARGE SUPPORT TEAM (NETWORK) [phone]   PARENT CHILD HEALTH (Maternity/NICU/Pediatrics) [phone]   [facility] [phone]   [facility] [phone]   [facility] [phone]   [facility] [phone]   [facility] [phone]   [facility] [phone]   [facility] [phone]   [facility] [phone]   [facility] [phone]   [facility] [phone]   [facility] [phone]   [place] [phone]

## 2025-04-16 NOTE — ASSESSMENT & PLAN NOTE
Presented with weakness coupled with high-grade fever/chills - evidence of lactic acidosis coupled with tachycardia/leukocytosis in the ED   Secondary to left-sided pyelonephritis (see below)  Blood cultures remain negative thus far  Lactic acidosis normalized status post IV fluids  Procalcitonin peak of 1.86 -> downtrended to 1.21  Monitor vitals and maintain hemodynamics -> goal MAP > 65

## 2025-04-17 LAB
ANION GAP SERPL CALCULATED.3IONS-SCNC: 6 MMOL/L (ref 4–13)
BASOPHILS # BLD AUTO: 0.02 THOUSANDS/ÂΜL (ref 0–0.1)
BASOPHILS NFR BLD AUTO: 1 % (ref 0–1)
BUN SERPL-MCNC: 5 MG/DL (ref 5–25)
CALCIUM SERPL-MCNC: 8.7 MG/DL (ref 8.4–10.2)
CHLORIDE SERPL-SCNC: 109 MMOL/L (ref 96–108)
CO2 SERPL-SCNC: 25 MMOL/L (ref 21–32)
CREAT SERPL-MCNC: 0.54 MG/DL (ref 0.6–1.3)
EOSINOPHIL # BLD AUTO: 0.26 THOUSAND/ÂΜL (ref 0–0.61)
EOSINOPHIL NFR BLD AUTO: 7 % (ref 0–6)
ERYTHROCYTE [DISTWIDTH] IN BLOOD BY AUTOMATED COUNT: 11.7 % (ref 11.6–15.1)
GFR SERPL CREATININE-BSD FRML MDRD: 103 ML/MIN/1.73SQ M
GLUCOSE SERPL-MCNC: 106 MG/DL (ref 65–140)
HCT VFR BLD AUTO: 36.9 % (ref 34.8–46.1)
HGB BLD-MCNC: 12.4 G/DL (ref 11.5–15.4)
IMM GRANULOCYTES # BLD AUTO: 0.01 THOUSAND/UL (ref 0–0.2)
IMM GRANULOCYTES NFR BLD AUTO: 0 % (ref 0–2)
LYMPHOCYTES # BLD AUTO: 1.22 THOUSANDS/ÂΜL (ref 0.6–4.47)
LYMPHOCYTES NFR BLD AUTO: 31 % (ref 14–44)
MAGNESIUM SERPL-MCNC: 2 MG/DL (ref 1.9–2.7)
MCH RBC QN AUTO: 30.2 PG (ref 26.8–34.3)
MCHC RBC AUTO-ENTMCNC: 33.6 G/DL (ref 31.4–37.4)
MCV RBC AUTO: 90 FL (ref 82–98)
MONOCYTES # BLD AUTO: 0.3 THOUSAND/ÂΜL (ref 0.17–1.22)
MONOCYTES NFR BLD AUTO: 8 % (ref 4–12)
NEUTROPHILS # BLD AUTO: 2.16 THOUSANDS/ÂΜL (ref 1.85–7.62)
NEUTS SEG NFR BLD AUTO: 53 % (ref 43–75)
NRBC BLD AUTO-RTO: 0 /100 WBCS
PLATELET # BLD AUTO: 173 THOUSANDS/UL (ref 149–390)
PMV BLD AUTO: 9.4 FL (ref 8.9–12.7)
POTASSIUM SERPL-SCNC: 3.9 MMOL/L (ref 3.5–5.3)
RBC # BLD AUTO: 4.1 MILLION/UL (ref 3.81–5.12)
SODIUM SERPL-SCNC: 140 MMOL/L (ref 135–147)
WBC # BLD AUTO: 3.97 THOUSAND/UL (ref 4.31–10.16)

## 2025-04-17 PROCEDURE — 83735 ASSAY OF MAGNESIUM: CPT | Performed by: INTERNAL MEDICINE

## 2025-04-17 PROCEDURE — 99232 SBSQ HOSP IP/OBS MODERATE 35: CPT | Performed by: INTERNAL MEDICINE

## 2025-04-17 PROCEDURE — 80048 BASIC METABOLIC PNL TOTAL CA: CPT | Performed by: INTERNAL MEDICINE

## 2025-04-17 PROCEDURE — 85025 COMPLETE CBC W/AUTO DIFF WBC: CPT | Performed by: INTERNAL MEDICINE

## 2025-04-17 RX ADMIN — ACETAMINOPHEN 650 MG: 325 TABLET, FILM COATED ORAL at 22:54

## 2025-04-17 RX ADMIN — ACETAMINOPHEN 650 MG: 325 TABLET, FILM COATED ORAL at 15:40

## 2025-04-17 RX ADMIN — Medication 1000 MG: at 09:38

## 2025-04-17 RX ADMIN — ACETAMINOPHEN 650 MG: 325 TABLET, FILM COATED ORAL at 03:59

## 2025-04-17 NOTE — PROGRESS NOTES
Progress Note - Hospitalist   Name: Adrienne Mendes 59 y.o. female I MRN: [id number]  Unit/Bed#: -01 I Date of Admission: 4/14/2025   Date of Service: 4/17/2025 I Hospital Day: 2    Assessment & Plan  Severe sepsis (HCC)  Presented with weakness coupled with high-grade fever/chills - evidence of lactic acidosis coupled with tachycardia/leukocytosis in the ED   Secondary to left-sided pyelonephritis (see below)  Blood cultures remain negative thus far -> remains negative by tomorrow (72 hr piotr), anticipate discharge home on oral antibiotics  Lactic acidosis normalized status post IV fluids  Procalcitonin peak of 1.86 -> downtrended to 1.21  Monitor vitals and maintain hemodynamics -> goal MAP > 65  Pyelonephritis of left kidney  Continue IV Rocephin -> plan to transition to oral cephalosporin on tentative discharge tomorrow  Urine culture growing E. coli  PRN pain/emesis control  Appreciate urology input -> recommend avoidance of calcium/vitamin D supplements in the setting of prior history of hypercalcemia with calcifications noted on prior CT imaging - outpatient urology follow-up  Headache  PRN Fioricet on board  CT of head negative for acute intracranial etiology  Electrolyte abnormalities  Monitor/replete serum potassium and magnesium, as necessary  Thrombocytopenia (HCC)  Monitor platelet count - monitor for bleeding  Platelet count currently normalized      VTE Pharmacologic Prophylaxis: VTE Score: 4 Moderate Risk (Score 3-4) - Pharmacological DVT Prophylaxis Ordered: Heparin.    AM-PAC Basic Mobility:  Basic Mobility Inpatient Raw Score: 22  JH-HLM Goal: 7: Walk 25 feet or more  JH-HLM Achieved: 7: Walk 25 feet or more  JH-HLM Goal achieved. Continue to encourage appropriate mobility.    Patient Centered Rounds:  I have performed bedside rounds and discussed plan of care with nursing today.  Discussions with Specialists or Other Care Team Provider:  see above assessments if applicable    Education and  Discussions with Family / Patient:  Patient at bedside, who will self-notify contacts, as necessary    Time Spent for Care:  35 minutes. More than 50% of total time spent on counseling and coordination of care, on one or more of the following: performing physical exam; counseling and coordination of care, obtaining or reviewing history, documenting in the medical record, reviewing/ordering tests/medications/procedures, and communicating with other healthcare professionals.    Current Length of Stay: 2 day(s)  Current Patient Status: Inpatient   Certification Statement:  Patient will continue to require additional hospital stay due to assessments as noted above.    Code Status: Level 1 - Full Code      Subjective     Encountered earlier in the morning.  Resting comfortably in bed stating that her headaches have improved at this time.  Denies fever/chills currently.  Denies flank pain currently.        Objective     Vitals:   Temp (24hrs), Av.8 °F (37.1 °C), Min:98.4 °F (36.9 °C), Max:99 °F (37.2 °C)    Temp:  [98.4 °F (36.9 °C)-99 °F (37.2 °C)] 99 °F (37.2 °C)  HR:  [70-77] 77  Resp:  [18] 18  BP: (104-113)/(63-74) 113/74  SpO2:  [92 %-99 %] 98 %  Body mass index is 28.34 kg/m².     Input and Output Summary (last 24 hours):       Intake/Output Summary (Last 24 hours) at 2025 1606  Last data filed at 2025 0149  Gross per 24 hour   Intake 2000 ml   Output --   Net 2000 ml       Physical Exam:     GENERAL Improved distress from pain   HEAD   Normocephalic - atraumatic   EYES Nonicteric   MOUTH   Mucosa moist   NECK   Supple - full range of motion   CARDIAC Rate controlled   PULMONARY Clear bilateral breath sounds   ABDOMEN Nontender/nondistended   MUSCULOSKELETAL   Motor strength/range of motion fairly intact   NEUROLOGIC   Alert/oriented at baseline   PSYCHIATRIC   Mood/affect stable         Labs & Recent Cultures:  Results from last 7 days   Lab Units 25  0525   WBC Thousand/uL 3.97*   HEMOGLOBIN  g/dL 12.4   HEMATOCRIT % 36.9   PLATELETS Thousands/uL 173   SEGS PCT % 53   LYMPHO PCT % 31   MONO PCT % 8   EOS PCT % 7*     Results from last 7 days   Lab Units 04/17/25  0525 04/16/25  0449   POTASSIUM mmol/L 3.9 3.2*   CHLORIDE mmol/L 109* 109*   CO2 mmol/L 25 25   BUN mg/dL 5 6   CREATININE mg/dL 0.54* 0.52*   CALCIUM mg/dL 8.7 8.1*   ALK PHOS U/L  --  65   ALT U/L  --  10   AST U/L  --  10*     Results from last 7 days   Lab Units 04/14/25  1541   INR  1.09     Results from last 7 days   Lab Units 04/14/25  1507   POC GLUCOSE mg/dl 85         Results from last 7 days   Lab Units 04/16/25  0449 04/15/25  0500 04/14/25  1921 04/14/25  1801 04/14/25  1541   LACTIC ACID mmol/L  --   --   --  0.6 2.9*   PROCALCITONIN ng/ml 1.21* 1.86* 0.73*  --  0.38*         Results from last 7 days   Lab Units 04/14/25  1600 04/14/25  1523   BLOOD CULTURE   --  No Growth at 48 hrs.  No Growth at 48 hrs.   URINE CULTURE  >100,000 cfu/ml Escherichia coli*  --          Lines/Drains/Telemetry:  Invasive Devices       Peripheral Intravenous Line  Duration             Peripheral IV 04/14/25 Distal;Left;Ventral (anterior) Forearm 2 days                    Last 24 Hours Medication List:   Current Facility-Administered Medications   Medication Dose Route Frequency Provider Last Rate    acetaminophen  650 mg Oral Q6H PRN Maryann De Santiago MD      butalbital-acetaminophen-caffeine  1 tablet Oral Q4H PRN Maryann De Santiago MD      cefTRIAXone  1,000 mg Intravenous Q24H Riley Aguilar MD 1,000 mg (04/17/25 0938)    heparin (porcine)  5,000 Units Subcutaneous Q8H [facility] Riley Aguilar MD      ondansetron  4 mg Intravenous Q6H PRN Ina Donald PA-C      oxyCODONE  5 mg Oral Once Mark Mcfarland MD      senna-docusate sodium  1 tablet Oral BID MD MARYANN Zhou MD   Hospitalist - [facility]        ** Please Note:  Documentation is constructed using a voice recognition dictation system.  An  occasional wrong word/phrase or “sound-a-like” substitution may have been picked up by dictation device due to the inherent limitations of voice recognition software.  Read the chart carefully and recognize, using reasonable context, where substitutions may have occurred.**

## 2025-04-17 NOTE — PLAN OF CARE
Problem: Potential for Falls  Goal: Patient will remain free of falls  Description: INTERVENTIONS:- Educate patient/family on patient safety including physical limitations- Instruct patient to call for assistance with activity - Consult OT/PT to assist with strengthening/mobility - Keep Call bell within reach- Keep bed low and locked with side rails adjusted as appropriate- Keep care items and personal belongings within reach- Initiate and maintain comfort rounds- Make Fall Risk Sign visible to staff- Offer Toileting every      Problem: GENITOURINARY - ADULT  Goal: Maintains or returns to baseline urinary function  Description: INTERVENTIONS:- Assess urinary function- Encourage oral fluids to ensure adequate hydration if ordered- Administer IV fluids as ordered to ensure adequate hydration- Administer ordered medications as needed- Offer frequent toileting- Follow urinary retention protocol if ordered  Outcome: Progressing  Goal: Absence of urinary retention  Description: INTERVENTIONS:- Assess patient’s ability to void and empty bladder- Monitor I/O- Bladder scan as needed- Discuss with physician/AP medications to alleviate retention as needed- Discuss catheterization for long term situations as appropriate  Outcome: Progressing  Goal: Urinary catheter remains patent  Description: INTERVENTIONS:- Assess patency of urinary catheter- If patient has a chronic oliva, consider changing catheter if non-functioning- Follow guidelines for intermittent irrigation of non-functioning urinary catheter  Outcome: Progressing     Problem: PAIN - ADULT  Goal: Verbalizes/displays adequate comfort level or baseline comfort level  Description: Interventions:- Encourage patient to monitor pain and request assistance- Assess pain using appropriate pain scale- Administer analgesics based on type and severity of pain and evaluate response- Implement non-pharmacological measures as appropriate and evaluate response- Consider cultural  and social influences on pain and pain management- Notify physician/advanced practitioner if interventions unsuccessful or patient reports new pain  Outcome: Progressing     Problem: INFECTION - ADULT  Goal: Absence or prevention of progression during hospitalization  Description: INTERVENTIONS:- Assess and monitor for signs and symptoms of infection- Monitor lab/diagnostic results- Monitor all insertion sites, i.e. indwelling lines, tubes, and drains- Monitor endotracheal if appropriate and nasal secretions for changes in amount and color- [place] appropriate cooling/warming therapies per order- Administer medications as ordered- Instruct and encourage patient and family to use good hand hygiene technique- Identify and instruct in appropriate isolation precautions for identified infection/condition  Outcome: Progressing  Goal: Absence of fever/infection during neutropenic period  Description: INTERVENTIONS:- Monitor WBC  Outcome: Progressing     Problem: Knowledge Deficit  Goal: Patient/family/caregiver demonstrates understanding of disease process, treatment plan, medications, and discharge instructions  Description: Complete learning assessment and assess knowledge base.Interventions:- Provide teaching at level of understanding- Provide teaching via preferred learning methods  Outcome: Progressing

## 2025-04-17 NOTE — CASE MANAGEMENT
Case Management Assessment & Discharge Planning Note    Patient name Adrienne Mendes  Location /-01 MRN [id number]  : 1965 Date 2025       Current Admission Date: 2025  Current Admission Diagnosis:Severe sepsis (HCC)   Patient Active Problem List    Diagnosis Date Noted Date Diagnosed    Headache 2025     Electrolyte abnormalities 2025     Thrombocytopenia (HCC) 2025     Severe sepsis (HCC) 2025     Pyelonephritis of left kidney 2025     S/P partial thyroidectomy 2024     Hypercalciuria 2024     Nephrolithiasis 2024     Kidney disease 2022       LOS (days): 2  Geometric Mean LOS (GMLOS) (days):   Days to GMLOS:     OBJECTIVE:    Risk of Unplanned Readmission Score: 6.17         Current admission status: Inpatient       Preferred Pharmacy:   [facility]/pharmacy #[id number] - [address] [address]  Phone: [phone] Fax: [phone]    Primary Care Provider: VIKTORIA Willis    Primary Insurance: [employer]  Secondary Insurance:     ASSESSMENT:  Active Health Care Proxies       dustin kodi Health Care Agent - Son   Primary Phone: [phone] (Mobile)                 Advance Directives  Does patient have a Health Care POA?: Yes  Does patient have Advance Directives?: Yes  Advance Directives: Living will, Power of  for health care, Power of  for finance  Primary Contact: Her children         Readmission Root Cause  30 Day Readmission: No    Patient Information  Admitted from:: Home  Mental Status: Alert  During Assessment patient was accompanied by: Not accompanied during assessment  Assessment information provided by:: Patient  Primary Caregiver: Self  Support Systems: Son, Daughter  County of Residence: [place]  What city do you live in?: [place]  Home entry access options. Select all that apply.: No steps to enter home  Type of Current  Residence: 2 story home  Upon entering residence, is there a bedroom on the main floor (no further steps)?: No  A bedroom is located on the following floor levels of residence (select all that apply):: 2nd Floor  Upon entering residence, is there a bathroom on the main floor (no further steps)?: Yes  Number of steps to 2nd floor from main floor: One Flight  Living Arrangements: Lives Alone  Is patient a ?: No    Activities of Daily Living Prior to Admission  Functional Status: Independent  Completes ADLs independently?: Yes  Ambulates independently?: Yes  Does patient use assisted devices?: No  Does patient currently own DME?: No  Does patient have a history of Outpatient Therapy (PT/OT)?: Yes  Does the patient have a history of Short-Term Rehab?: No  Does patient have a history of HHC?: No  Does patient currently have HHC?: No         Patient Information Continued  Income Source: Employed  Does patient have prescription coverage?: Yes  Can the patient afford their medications and any related supplies (such as glucometers or test strips)?: Yes  Does patient receive dialysis treatments?: No  Does patient have a history of substance abuse?: No  Does patient have a history of Mental Health Diagnosis?: No         Means of Transportation  Means of Transport to Appts:: Drives Self          DISCHARGE DETAILS:    Discharge planning discussed with:: patient  Freedom of Choice: Yes  Comments - Freedom of Choice: no anticipated DC needs  CM contacted family/caregiver?: No- see comments                  Requested Home Health Care         Is the patient interested in HHC at discharge?: No    DME Referral Provided  Referral made for DME?: No    Other Referral/Resources/Interventions Provided:  Referral Comments: no anticipated DC needs         Treatment Team Recommendation: Home  Discharge Destination Plan:: Home  Transport at Discharge : Family

## 2025-04-17 NOTE — ASSESSMENT & PLAN NOTE
Continue IV Rocephin -> plan to transition to oral cephalosporin on tentative discharge tomorrow  Urine culture growing E. coli  PRN pain/emesis control  Appreciate urology input -> recommend avoidance of calcium/vitamin D supplements in the setting of prior history of hypercalcemia with calcifications noted on prior CT imaging - outpatient urology follow-up

## 2025-04-17 NOTE — ASSESSMENT & PLAN NOTE
Presented with weakness coupled with high-grade fever/chills - evidence of lactic acidosis coupled with tachycardia/leukocytosis in the ED   Secondary to left-sided pyelonephritis (see below)  Blood cultures remain negative thus far -> remains negative by tomorrow (72 hr ipotr), anticipate discharge home on oral antibiotics  Lactic acidosis normalized status post IV fluids  Procalcitonin peak of 1.86 -> downtrended to 1.21  Monitor vitals and maintain hemodynamics -> goal MAP > 65

## 2025-04-18 ENCOUNTER — TELEPHONE (OUTPATIENT)
Dept: NEPHROLOGY | Facility: CLINIC | Age: 60
End: 2025-04-18

## 2025-04-18 ENCOUNTER — TRANSITIONAL CARE MANAGEMENT (OUTPATIENT)
Dept: FAMILY MEDICINE CLINIC | Facility: CLINIC | Age: 60
End: 2025-04-18

## 2025-04-18 VITALS
WEIGHT: 150 LBS | TEMPERATURE: 97.8 F | DIASTOLIC BLOOD PRESSURE: 57 MMHG | SYSTOLIC BLOOD PRESSURE: 92 MMHG | HEART RATE: 71 BPM | RESPIRATION RATE: 20 BRPM | HEIGHT: 61 IN | BODY MASS INDEX: 28.32 KG/M2 | OXYGEN SATURATION: 97 %

## 2025-04-18 LAB
ANION GAP SERPL CALCULATED.3IONS-SCNC: 7 MMOL/L (ref 4–13)
BASOPHILS # BLD AUTO: 0.02 THOUSANDS/ÂΜL (ref 0–0.1)
BASOPHILS NFR BLD AUTO: 1 % (ref 0–1)
BUN SERPL-MCNC: 8 MG/DL (ref 5–25)
CALCIUM SERPL-MCNC: 9.1 MG/DL (ref 8.4–10.2)
CHLORIDE SERPL-SCNC: 106 MMOL/L (ref 96–108)
CO2 SERPL-SCNC: 25 MMOL/L (ref 21–32)
CREAT SERPL-MCNC: 0.59 MG/DL (ref 0.6–1.3)
EOSINOPHIL # BLD AUTO: 0.25 THOUSAND/ÂΜL (ref 0–0.61)
EOSINOPHIL NFR BLD AUTO: 7 % (ref 0–6)
ERYTHROCYTE [DISTWIDTH] IN BLOOD BY AUTOMATED COUNT: 11.8 % (ref 11.6–15.1)
GFR SERPL CREATININE-BSD FRML MDRD: 100 ML/MIN/1.73SQ M
GLUCOSE SERPL-MCNC: 102 MG/DL (ref 65–140)
HCT VFR BLD AUTO: 37.6 % (ref 34.8–46.1)
HGB BLD-MCNC: 12.7 G/DL (ref 11.5–15.4)
IMM GRANULOCYTES # BLD AUTO: 0.01 THOUSAND/UL (ref 0–0.2)
IMM GRANULOCYTES NFR BLD AUTO: 0 % (ref 0–2)
LYMPHOCYTES # BLD AUTO: 1.54 THOUSANDS/ÂΜL (ref 0.6–4.47)
LYMPHOCYTES NFR BLD AUTO: 42 % (ref 14–44)
MCH RBC QN AUTO: 30.2 PG (ref 26.8–34.3)
MCHC RBC AUTO-ENTMCNC: 33.8 G/DL (ref 31.4–37.4)
MCV RBC AUTO: 90 FL (ref 82–98)
MONOCYTES # BLD AUTO: 0.32 THOUSAND/ÂΜL (ref 0.17–1.22)
MONOCYTES NFR BLD AUTO: 9 % (ref 4–12)
NEUTROPHILS # BLD AUTO: 1.57 THOUSANDS/ÂΜL (ref 1.85–7.62)
NEUTS SEG NFR BLD AUTO: 41 % (ref 43–75)
NRBC BLD AUTO-RTO: 0 /100 WBCS
PLATELET # BLD AUTO: 219 THOUSANDS/UL (ref 149–390)
PMV BLD AUTO: 9.1 FL (ref 8.9–12.7)
POTASSIUM SERPL-SCNC: 3.9 MMOL/L (ref 3.5–5.3)
RBC # BLD AUTO: 4.2 MILLION/UL (ref 3.81–5.12)
SODIUM SERPL-SCNC: 138 MMOL/L (ref 135–147)
WBC # BLD AUTO: 3.71 THOUSAND/UL (ref 4.31–10.16)

## 2025-04-18 PROCEDURE — 80048 BASIC METABOLIC PNL TOTAL CA: CPT | Performed by: INTERNAL MEDICINE

## 2025-04-18 PROCEDURE — 85025 COMPLETE CBC W/AUTO DIFF WBC: CPT | Performed by: INTERNAL MEDICINE

## 2025-04-18 PROCEDURE — 99239 HOSP IP/OBS DSCHRG MGMT >30: CPT | Performed by: INTERNAL MEDICINE

## 2025-04-18 RX ORDER — CEFPODOXIME PROXETIL 200 MG/1
200 TABLET, FILM COATED ORAL 2 TIMES DAILY
Qty: 4 TABLET | Refills: 0 | Status: SHIPPED | OUTPATIENT
Start: 2025-04-19 | End: 2025-04-21

## 2025-04-18 RX ADMIN — SENNOSIDES AND DOCUSATE SODIUM 1 TABLET: 50; 8.6 TABLET ORAL at 09:50

## 2025-04-18 RX ADMIN — Medication 1000 MG: at 09:38

## 2025-04-18 NOTE — ASSESSMENT & PLAN NOTE
Continue IV Rocephin -> now transitioned to oral Vantin to complete course post discharge   Urine culture growing E. coli  PRN pain/emesis control implemented during hospitalization  Appreciate urology input -> recommend avoidance of calcium/vitamin D supplements in the setting of prior history of hypercalcemia with calcifications noted on prior CT imaging - outpatient urology follow-up

## 2025-04-18 NOTE — NURSING NOTE
Refusing sub Q8hr heparin. AXO4, VSS, ambulatory w/out assistance. Educated purpose of; risks. Expressed understanding. Refusal maintained. SLIM aware.

## 2025-04-18 NOTE — DISCHARGE SUMMARY
Discharge Summary - Hospitalist   Name: Adrienne Mendes 59 y.o. female I MRN: [id number]  Unit/Bed#: -01 I Date of Admission: 4/14/2025   Date of Service: 4/18/2025 I Hospital Day: 3     Assessment & Plan  Severe sepsis (HCC)  Presented with weakness coupled with high-grade fever/chills - evidence of lactic acidosis coupled with tachycardia/leukocytosis in the ED   Secondary to left-sided pyelonephritis (see below)  Blood cultures remain negative at 72-hour piotr  Lactic acidosis normalized status post IV fluids  Procalcitonin peak of 1.86 -> downtrended to 1.21  Currently hemodynamically stable with resolving symptomatology  Plan for discharge home today  Pyelonephritis of left kidney  Continue IV Rocephin -> now transitioned to oral Vantin to complete course post discharge   Urine culture growing E. coli  PRN pain/emesis control implemented during hospitalization  Appreciate urology input -> recommend avoidance of calcium/vitamin D supplements in the setting of prior history of hypercalcemia with calcifications noted on prior CT imaging - outpatient urology follow-up  Headache  PRN Fioricet on board during hospitalization  CT of head negative for acute intracranial etiology  Electrolyte abnormalities  Monitored/repleted serum potassium/magnesium, as necessary  Thrombocytopenia (HCC)  Platelet count currently normalized         Discharging Physician / Practitioner: Maryann De Santiago MD  PCP: VIKTORIA Willis    Admission Date:   Admission Orders (From admission, onward)       Ordered        04/16/25 1314  INPATIENT ADMISSION  Once            04/14/25 1917  Place in Observation  Once            04/14/25 1912  INPATIENT ADMISSION  Once                          Discharge Date: 04/18/25      Reason for Admission:     Fever/chills with initial flank tenderness      Discharge Diagnoses:     Principal Problem:    Severe sepsis (HCC)    Pyelonephritis of left kidney    Active Problems:    Headache    Resolved Problems:     "Electrolyte abnormalities    Thrombocytopenia (HCC)      Consultations During Hospital Stay:   Urology      Condition at Discharge: fair       Discharge Day Visit / Exam:     Vitals: Blood Pressure: 92/57 (04/18/25 0725)  Pulse: 71 (04/18/25 0725)  Temperature: 97.8 °F (36.6 °C) (04/18/25 0725)  Temp Source: Oral (04/18/25 0725)  Respirations: 20 (04/18/25 0725)  Height: 5' 1\" (154.9 cm) (04/14/25 1929)  Weight - Scale: 68 kg (150 lb) (04/14/25 1929)  SpO2: 97 % (04/18/25 0725)      Physical exam - I had a face-to-face encounter with the patient on day of discharge.      Discussion with Patient and/or Family:  The patient has been advised to return to the ER immediately if any symptoms recur or worsen.       Discharge Instructions/Information to Patient and/or Family:   See after visit summary for information provided to patient and/or family.        Provisions for Follow-up Care:   See after visit summary for information related to follow-up care and any pertinent home health orders.        Disposition:   Home      Discharge Medications:   See after visit summary for reconciled discharge medications provided to patient and/or family.        Discharge Statement:   I spent 38 minutes discharging the patient. This time was spent on the day of discharge. I had direct contact with the patient on the day of discharge. Greater than 50% of the total time was spent examining patient, answering all patient questions, arranging and discussing plan of care with patient as well as directly providing post-discharge instructions.  Additional time then spent on discharge activities.           VASYL KEYES MD    Hospitalist - [facility]        ** Please Note:  Documentation is constructed using a voice recognition dictation system.  An occasional wrong word/phrase or “sound-a-like” substitution may have been picked up by dictation device due to the inherent limitations of voice recognition software.  Read the " chart carefully and recognize, using reasonable context, where substitutions may have occurred.**

## 2025-04-18 NOTE — ASSESSMENT & PLAN NOTE
Presented with weakness coupled with high-grade fever/chills - evidence of lactic acidosis coupled with tachycardia/leukocytosis in the ED   Secondary to left-sided pyelonephritis (see below)  Blood cultures remain negative at 72-hour piotr  Lactic acidosis normalized status post IV fluids  Procalcitonin peak of 1.86 -> downtrended to 1.21  Currently hemodynamically stable with resolving symptomatology  Plan for discharge home today

## 2025-04-18 NOTE — PLAN OF CARE
Problem: Potential for Falls  Goal: Patient will remain free of falls  Description: INTERVENTIONS:- Educate patient/family on patient safety including physical limitations- Instruct patient to call for assistance with activity - Consult OT/PT to assist with strengthening/mobility - Keep Call bell within reach- Keep bed low and locked with side rails adjusted as appropriate- Keep care items and personal belongings within reach- Initiate and maintain comfort rounds- Make Fall Risk Sign visible to staff- Offer Toileting every  Outcome: Progressing     Problem: GENITOURINARY - ADULT  Goal: Maintains or returns to baseline urinary function  Description: INTERVENTIONS:- Assess urinary function- Encourage oral fluids to ensure adequate hydration if ordered- Administer IV fluids as ordered to ensure adequate hydration- Administer ordered medications as needed- Offer frequent toileting- Follow urinary retention protocol if ordered  Outcome: Progressing  Goal: Absence of urinary retention  Description: INTERVENTIONS:- Assess patient’s ability to void and empty bladder- Monitor I/O- Bladder scan as needed- Discuss with physician/AP medications to alleviate retention as needed- Discuss catheterization for long term situations as appropriate  Outcome: Progressing  Goal: Urinary catheter remains patent  Description: INTERVENTIONS:- Assess patency of urinary catheter- If patient has a chronic oliva, consider changing catheter if non-functioning- Follow guidelines for intermittent irrigation of non-functioning urinary catheter  Outcome: Progressing     Problem: PAIN - ADULT  Goal: Verbalizes/displays adequate comfort level or baseline comfort level  Description: Interventions:- Encourage patient to monitor pain and request assistance- Assess pain using appropriate pain scale- Administer analgesics based on type and severity of pain and evaluate response- Implement non-pharmacological measures as appropriate and evaluate response-  Consider cultural and social influences on pain and pain management- Notify physician/advanced practitioner if interventions unsuccessful or patient reports new pain  Outcome: Progressing     Problem: INFECTION - ADULT  Goal: Absence or prevention of progression during hospitalization  Description: INTERVENTIONS:- Assess and monitor for signs and symptoms of infection- Monitor lab/diagnostic results- Monitor all insertion sites, i.e. indwelling lines, tubes, and drains- Monitor endotracheal if appropriate and nasal secretions for changes in amount and color- [place] appropriate cooling/warming therapies per order- Administer medications as ordered- Instruct and encourage patient and family to use good hand hygiene technique- Identify and instruct in appropriate isolation precautions for identified infection/condition  Outcome: Progressing  Goal: Absence of fever/infection during neutropenic period  Description: INTERVENTIONS:- Monitor WBC  Outcome: Progressing     Problem: Knowledge Deficit  Goal: Patient/family/caregiver demonstrates understanding of disease process, treatment plan, medications, and discharge instructions  Description: Complete learning assessment and assess knowledge base.Interventions:- Provide teaching at level of understanding- Provide teaching via preferred learning methods  Outcome: Progressing

## 2025-04-18 NOTE — TELEPHONE ENCOUNTER
I called patient from the recall list and schedule appointment for 08/15/2025 @ 11:00 am with Dr. Claudio. I called patient and is was unable to speak with patient nor leave a message. Appointment letter send to patient

## 2025-04-19 VITALS
RESPIRATION RATE: 20 BRPM | TEMPERATURE: 98 F | OXYGEN SATURATION: 100 % | HEIGHT: 61 IN | HEART RATE: 60 BPM | WEIGHT: 150 LBS | DIASTOLIC BLOOD PRESSURE: 58 MMHG | SYSTOLIC BLOOD PRESSURE: 115 MMHG | BODY MASS INDEX: 28.32 KG/M2

## 2025-04-19 LAB
BACTERIA BLD CULT: NORMAL
BACTERIA BLD CULT: NORMAL

## 2025-04-25 ENCOUNTER — OFFICE VISIT (OUTPATIENT)
Dept: FAMILY MEDICINE CLINIC | Facility: CLINIC | Age: 60
End: 2025-04-25
Payer: COMMERCIAL

## 2025-04-25 VITALS
WEIGHT: 141 LBS | HEART RATE: 81 BPM | SYSTOLIC BLOOD PRESSURE: 92 MMHG | OXYGEN SATURATION: 97 % | DIASTOLIC BLOOD PRESSURE: 62 MMHG | BODY MASS INDEX: 26.62 KG/M2 | HEIGHT: 61 IN

## 2025-04-25 DIAGNOSIS — E83.42 HYPOMAGNESEMIA: ICD-10-CM

## 2025-04-25 DIAGNOSIS — R65.20 SEVERE SEPSIS (HCC): ICD-10-CM

## 2025-04-25 DIAGNOSIS — A41.9 SEVERE SEPSIS (HCC): ICD-10-CM

## 2025-04-25 DIAGNOSIS — N12 PYELONEPHRITIS OF LEFT KIDNEY: ICD-10-CM

## 2025-04-25 DIAGNOSIS — R53.83 OTHER FATIGUE: Primary | ICD-10-CM

## 2025-04-25 DIAGNOSIS — N12 PYELONEPHRITIS: ICD-10-CM

## 2025-04-25 DIAGNOSIS — Z00.00 HEALTHCARE MAINTENANCE: ICD-10-CM

## 2025-04-25 DIAGNOSIS — Z09 HOSPITAL DISCHARGE FOLLOW-UP: ICD-10-CM

## 2025-04-25 PROCEDURE — 99496 TRANSJ CARE MGMT HIGH F2F 7D: CPT | Performed by: NURSE PRACTITIONER

## 2025-04-25 NOTE — PROGRESS NOTES
Transition of Care Visit:  Name: Adrienne Mendes      : 1965      MRN: [id number]  Encounter Provider: VIKTORIA Willis  Encounter Date: 2025   Encounter department: [facility] [address]    Assessment & Plan  Other fatigue    Orders:    Vitamin B12; Future    Vitamin D 25 hydroxy; Future    Pyelonephritis    Orders:    UA w Reflex to Microscopic w Reflex to Culture; Future    Hypomagnesemia    Orders:    Magnesium; Future    Healthcare maintenance    Orders:    CBC and differential; Future    Comprehensive metabolic panel; Future    TSH, 3rd generation with Free T4 reflex; Future    Lipid panel; Future    Pyelonephritis of left kidney  Feeling much better.  Patient does continue with fatigue.  Denies fevers.  Will obtain UA to ensure that infection cleared.       Severe sepsis (HCC)  Resolved in hospital.  Will follow CBC.       Hospital discharge follow-up              History of Present Illness     Transitional Care Management Review:   Adrienne Mendes is a 59 y.o. female here for TCM follow up.     During the TCM phone call patient stated:  TCM Call (since 2025)       Date and time call was made  2025  1:57 PM    Hospital care reviewed  Records reviewed    Patient was hospitialized at  [facility]    Date of Admission  25    Date of discharge  25    Diagnosis  Severe sepsis    Disposition  Home    Were the patients medications reviewed and updated  Yes    Current Symptoms  None          TCM Call (since 2025)       Post hospital issues  None    Scheduled for follow up?  Yes    Did you obtain your prescribed medications  Yes    Do you need help managing your prescriptions or medications  No    Is transportation to your appointment needed  No    I have advised the patient to call PCP with any new or worsening symptoms  Sangeeta Burton, Practice Coordinator    Living Arrangements  Family members          Patient presents for TCM.  "Patient was hospitalized from 4/14-4/18 for sepsis d/t pyelonephritis. Patient is very fatigued, but feeling well otherwise. Denies fevers, chills, flank pain, urinary symptoms.       Review of Systems   Constitutional:  Positive for fatigue. Negative for chills, diaphoresis and fever.   HENT:  Negative for ear pain and sore throat.    Eyes:  Negative for pain and visual disturbance.   Respiratory:  Negative for cough and shortness of breath.    Cardiovascular:  Negative for chest pain and palpitations.   Gastrointestinal:  Negative for abdominal pain and vomiting.   Genitourinary:  Negative for dysuria and hematuria.   Musculoskeletal:  Negative for arthralgias and back pain.   Skin:  Negative for color change and rash.   Neurological:  Negative for dizziness, seizures, syncope, light-headedness and headaches.   All other systems reviewed and are negative.    Objective   BP 92/62   Pulse 81   Ht 5' 1\" (1.549 m)   Wt 64 kg (141 lb)   SpO2 97%   BMI 26.64 kg/m²     Physical Exam  Vitals and nursing note reviewed.   Constitutional:       General: She is not in acute distress.     Appearance: She is well-developed.   HENT:      Head: Normocephalic and atraumatic.   Eyes:      Conjunctiva/sclera: Conjunctivae normal.   Cardiovascular:      Rate and Rhythm: Normal rate and regular rhythm.      Heart sounds: No murmur heard.  Pulmonary:      Effort: Pulmonary effort is normal. No respiratory distress.      Breath sounds: Normal breath sounds.   Abdominal:      Palpations: Abdomen is soft.      Tenderness: There is no abdominal tenderness. There is no right CVA tenderness or left CVA tenderness.   Musculoskeletal:         General: No swelling.      Cervical back: Neck supple.   Skin:     General: Skin is warm and dry.      Capillary Refill: Capillary refill takes less than 2 seconds.   Neurological:      Mental Status: She is alert and oriented to person, place, and time.   Psychiatric:         Mood and Affect: Mood " normal.       Medications have been reviewed by provider in current encounter

## 2025-04-25 NOTE — ASSESSMENT & PLAN NOTE
Feeling much better.  Patient does continue with fatigue.  Denies fevers.  Will obtain UA to ensure that infection cleared.

## 2025-04-28 ENCOUNTER — TELEPHONE (OUTPATIENT)
Age: 60
End: 2025-04-28

## 2025-04-28 ENCOUNTER — PATIENT MESSAGE (OUTPATIENT)
Age: 60
End: 2025-04-28

## 2025-04-28 NOTE — TELEPHONE ENCOUNTER
LVM SMS message with office phone number - not able to leave a voice message.    Wanted to schedule a follow up appt with Katey for May 9th.

## 2025-04-29 ENCOUNTER — APPOINTMENT (OUTPATIENT)
Age: 60
End: 2025-04-29
Payer: COMMERCIAL

## 2025-04-29 DIAGNOSIS — R53.83 OTHER FATIGUE: ICD-10-CM

## 2025-04-29 DIAGNOSIS — N12 PYELONEPHRITIS: ICD-10-CM

## 2025-04-29 DIAGNOSIS — Z00.00 HEALTHCARE MAINTENANCE: ICD-10-CM

## 2025-04-29 DIAGNOSIS — E83.42 HYPOMAGNESEMIA: ICD-10-CM

## 2025-04-29 LAB
25(OH)D3 SERPL-MCNC: 26.7 NG/ML (ref 30–100)
ALBUMIN SERPL BCG-MCNC: 3.9 G/DL (ref 3.5–5)
ALP SERPL-CCNC: 84 U/L (ref 34–104)
ALT SERPL W P-5'-P-CCNC: 20 U/L (ref 7–52)
ANION GAP SERPL CALCULATED.3IONS-SCNC: 10 MMOL/L (ref 4–13)
AST SERPL W P-5'-P-CCNC: 14 U/L (ref 13–39)
BASOPHILS # BLD AUTO: 0.04 THOUSANDS/ÂΜL (ref 0–0.1)
BASOPHILS NFR BLD AUTO: 1 % (ref 0–1)
BILIRUB SERPL-MCNC: 0.6 MG/DL (ref 0.2–1)
BILIRUB UR QL STRIP: NEGATIVE
BUN SERPL-MCNC: 17 MG/DL (ref 5–25)
CALCIUM SERPL-MCNC: 9.1 MG/DL (ref 8.4–10.2)
CHLORIDE SERPL-SCNC: 106 MMOL/L (ref 96–108)
CHOLEST SERPL-MCNC: 210 MG/DL (ref ?–200)
CLARITY UR: CLEAR
CO2 SERPL-SCNC: 26 MMOL/L (ref 21–32)
COLOR UR: COLORLESS
CREAT SERPL-MCNC: 0.69 MG/DL (ref 0.6–1.3)
EOSINOPHIL # BLD AUTO: 0.18 THOUSAND/ÂΜL (ref 0–0.61)
EOSINOPHIL NFR BLD AUTO: 4 % (ref 0–6)
ERYTHROCYTE [DISTWIDTH] IN BLOOD BY AUTOMATED COUNT: 12.4 % (ref 11.6–15.1)
GFR SERPL CREATININE-BSD FRML MDRD: 95 ML/MIN/1.73SQ M
GLUCOSE P FAST SERPL-MCNC: 96 MG/DL (ref 65–99)
GLUCOSE UR STRIP-MCNC: NEGATIVE MG/DL
HCT VFR BLD AUTO: 43 % (ref 34.8–46.1)
HDLC SERPL-MCNC: 66 MG/DL
HGB BLD-MCNC: 14.2 G/DL (ref 11.5–15.4)
HGB UR QL STRIP.AUTO: NEGATIVE
IMM GRANULOCYTES # BLD AUTO: 0.02 THOUSAND/UL (ref 0–0.2)
IMM GRANULOCYTES NFR BLD AUTO: 1 % (ref 0–2)
KETONES UR STRIP-MCNC: NEGATIVE MG/DL
LDLC SERPL CALC-MCNC: 132 MG/DL (ref 0–100)
LEUKOCYTE ESTERASE UR QL STRIP: NEGATIVE
LYMPHOCYTES # BLD AUTO: 1.92 THOUSANDS/ÂΜL (ref 0.6–4.47)
LYMPHOCYTES NFR BLD AUTO: 44 % (ref 14–44)
MAGNESIUM SERPL-MCNC: 2.1 MG/DL (ref 1.9–2.7)
MCH RBC QN AUTO: 30.3 PG (ref 26.8–34.3)
MCHC RBC AUTO-ENTMCNC: 33 G/DL (ref 31.4–37.4)
MCV RBC AUTO: 92 FL (ref 82–98)
MONOCYTES # BLD AUTO: 0.25 THOUSAND/ÂΜL (ref 0.17–1.22)
MONOCYTES NFR BLD AUTO: 6 % (ref 4–12)
NEUTROPHILS # BLD AUTO: 1.9 THOUSANDS/ÂΜL (ref 1.85–7.62)
NEUTS SEG NFR BLD AUTO: 44 % (ref 43–75)
NITRITE UR QL STRIP: NEGATIVE
NONHDLC SERPL-MCNC: 144 MG/DL
NRBC BLD AUTO-RTO: 0 /100 WBCS
PH UR STRIP.AUTO: 6.5 [PH]
PLATELET # BLD AUTO: 357 THOUSANDS/UL (ref 149–390)
PMV BLD AUTO: 9.5 FL (ref 8.9–12.7)
POTASSIUM SERPL-SCNC: 4.1 MMOL/L (ref 3.5–5.3)
PROT SERPL-MCNC: 6.9 G/DL (ref 6.4–8.4)
PROT UR STRIP-MCNC: NEGATIVE MG/DL
RBC # BLD AUTO: 4.68 MILLION/UL (ref 3.81–5.12)
SODIUM SERPL-SCNC: 142 MMOL/L (ref 135–147)
SP GR UR STRIP.AUTO: 1 (ref 1–1.03)
TRIGL SERPL-MCNC: 60 MG/DL (ref ?–150)
TSH SERPL DL<=0.05 MIU/L-ACNC: 2.29 UIU/ML (ref 0.45–4.5)
UROBILINOGEN UR STRIP-ACNC: <2 MG/DL
VIT B12 SERPL-MCNC: 470 PG/ML (ref 180–914)
WBC # BLD AUTO: 4.31 THOUSAND/UL (ref 4.31–10.16)

## 2025-04-29 PROCEDURE — 82306 VITAMIN D 25 HYDROXY: CPT

## 2025-04-29 PROCEDURE — 85025 COMPLETE CBC W/AUTO DIFF WBC: CPT

## 2025-04-29 PROCEDURE — 81003 URINALYSIS AUTO W/O SCOPE: CPT

## 2025-04-29 PROCEDURE — 80053 COMPREHEN METABOLIC PANEL: CPT

## 2025-04-29 PROCEDURE — 36415 COLL VENOUS BLD VENIPUNCTURE: CPT

## 2025-04-29 PROCEDURE — 83735 ASSAY OF MAGNESIUM: CPT

## 2025-04-29 PROCEDURE — 82607 VITAMIN B-12: CPT

## 2025-04-29 PROCEDURE — 84443 ASSAY THYROID STIM HORMONE: CPT

## 2025-04-29 PROCEDURE — 80061 LIPID PANEL: CPT

## 2025-05-01 ENCOUNTER — RESULTS FOLLOW-UP (OUTPATIENT)
Dept: FAMILY MEDICINE CLINIC | Facility: CLINIC | Age: 60
End: 2025-05-01

## 2025-05-02 NOTE — PROGRESS NOTES
Name: Adrienne Mendes      : 1965      MRN: [id number]  Encounter Provider: Adrienne Fair PA-C  Encounter Date: 2025   Encounter department: [facility] [place]  :  Assessment & Plan  Nephrolithiasis  CT chest AP w contrast 25 - New 2.1 cm heterogeneously enhancing lesion in the left kidney. Duplicated right renal collecting system with atrophy and scarring of the lower pole moiety, similar to the prior study.  Urine culture at that time positive for E. Coli.   Patient was treated with antibiotics  UA today negative for infection. Positive for blood. Will send for micro   PVR today 17 mL  Discussed conservative measures with adequate hydration, avoidance of bladder irritants, avoidance of constipation  Recommend CT renal protocol for further evaluation of this area.   Cystoscopy   Declines estrace at this time  ER precautions   Orders:    POCT urine dip    POCT Measure PVR    Abnormal CT scan  CT CAP as above. Atrophic right kidney  BMP within normal limits. Follows with nephrology   Orders:    CT renal protocol; Future        History of Present Illness   Adrienne Mendes is a 59 y.o. female who presents for follow up.    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 Has had pyelonephritis x2. States pyelonephritis typically occurs around stressful situations. Patient was admitted to the hospital in 2024 for pyelonephritis as above. Doing well since completion of antibiotics. Denies residual fever, chills, flank pain. Denies gross hematuria, dysuria, prior  surgical manipulation. Patient had bladder prolapse surgery and partial hysterectomy 25 years ago. Denies vaginal dryness.     Review of Systems   Constitutional:  Negative for activity change, appetite change, chills and fever.   HENT:  Negative for congestion and trouble swallowing.    Respiratory:  Negative for cough and shortness of breath.    Cardiovascular:  Negative for chest  "pain, palpitations and leg swelling.   Gastrointestinal:  Negative for abdominal pain, constipation, diarrhea, nausea and vomiting.   Genitourinary:  Negative for difficulty urinating, dysuria, flank pain, frequency, hematuria and urgency.   Musculoskeletal:  Negative for back pain and gait problem.   Skin:  Negative for wound.   Allergic/Immunologic: Negative for immunocompromised state.   Neurological:  Negative for dizziness and syncope.   Hematological:  Does not bruise/bleed easily.   Psychiatric/Behavioral:  Negative for confusion.    All other systems reviewed and are negative.         Objective   /72 (Patient Position: Sitting, Cuff Size: Standard)   Pulse 76   Temp 97.8 °F (36.6 °C) (Temporal)   Ht 5' 1\" (1.549 m)   Wt 70.3 kg (155 lb)   SpO2 98%   BMI 29.29 kg/m²     Physical Exam  Constitutional:       Appearance: Normal appearance.   HENT:      Head: Normocephalic.      Nose: Nose normal.      Mouth/Throat:      Pharynx: Oropharynx is clear.   Eyes:      Pupils: Pupils are equal, round, and reactive to light.   Pulmonary:      Effort: Pulmonary effort is normal.   Musculoskeletal:      Cervical back: Normal range of motion.   Skin:     General: Skin is warm.   Neurological:      General: No focal deficit present.      Mental Status: She is alert and oriented to person, place, and time. Mental status is at baseline.   Psychiatric:         Mood and Affect: Mood normal.         Behavior: Behavior normal.         Thought Content: Thought content normal.         Judgment: Judgment normal.           Results   No results found for: \"PSA\"  Lab Results   Component Value Date    CALCIUM 9.1 04/29/2025    K 4.1 04/29/2025    CO2 26 04/29/2025     04/29/2025    BUN 17 04/29/2025    CREATININE 0.69 04/29/2025     Lab Results   Component Value Date    WBC 4.31 04/29/2025    HGB 14.2 04/29/2025    HCT 43.0 04/29/2025    MCV 92 04/29/2025     04/29/2025       Office Urine Dip  Recent Results " (from the past hour)   POCT urine dip    Collection Time: 05/05/25  3:15 PM   Result Value Ref Range    LEUKOCYTE ESTERASE,UA -     NITRITE,UA -     SL AMB POCT UROBILINOGEN 0.2     POCT URINE PROTEIN -      PH,UA 6.0     BLOOD,UA Trace     SPECIFIC GRAVITY,UA 1.015     KETONES,UA -     BILIRUBIN,UA -     GLUCOSE, UA -      COLOR,UA Yellow     CLARITY,UA Clear    POCT Measure PVR    Collection Time: 05/05/25  3:21 PM   Result Value Ref Range    POST-VOID RESIDUAL VOLUME, ML POC 17 mL

## 2025-05-05 ENCOUNTER — TELEPHONE (OUTPATIENT)
Dept: UROLOGY | Facility: CLINIC | Age: 60
End: 2025-05-05

## 2025-05-05 ENCOUNTER — OFFICE VISIT (OUTPATIENT)
Dept: UROLOGY | Facility: CLINIC | Age: 60
End: 2025-05-05
Payer: COMMERCIAL

## 2025-05-05 VITALS
BODY MASS INDEX: 29.27 KG/M2 | WEIGHT: 155 LBS | OXYGEN SATURATION: 98 % | HEART RATE: 76 BPM | SYSTOLIC BLOOD PRESSURE: 118 MMHG | HEIGHT: 61 IN | TEMPERATURE: 97.8 F | DIASTOLIC BLOOD PRESSURE: 72 MMHG

## 2025-05-05 DIAGNOSIS — N20.0 NEPHROLITHIASIS: Primary | ICD-10-CM

## 2025-05-05 DIAGNOSIS — R93.89 ABNORMAL CT SCAN: ICD-10-CM

## 2025-05-05 LAB
BACTERIA UR QL AUTO: ABNORMAL /HPF
BILIRUB UR QL STRIP: NEGATIVE
CLARITY UR: CLEAR
COLOR UR: ABNORMAL
GLUCOSE UR STRIP-MCNC: NEGATIVE MG/DL
HGB UR QL STRIP.AUTO: NEGATIVE
KETONES UR STRIP-MCNC: NEGATIVE MG/DL
LEUKOCYTE ESTERASE UR QL STRIP: NEGATIVE
MUCOUS THREADS UR QL AUTO: ABNORMAL
NITRITE UR QL STRIP: NEGATIVE
NON-SQ EPI CELLS URNS QL MICRO: ABNORMAL /HPF
PH UR STRIP.AUTO: 7 [PH]
POST-VOID RESIDUAL VOLUME, ML POC: 17 ML
PROT UR STRIP-MCNC: NEGATIVE MG/DL
RBC #/AREA URNS AUTO: ABNORMAL /HPF
SL AMB  POCT GLUCOSE, UA: NORMAL
SL AMB LEUKOCYTE ESTERASE,UA: NORMAL
SL AMB POCT BILIRUBIN,UA: NORMAL
SL AMB POCT BLOOD,UA: NORMAL
SL AMB POCT CLARITY,UA: CLEAR
SL AMB POCT COLOR,UA: YELLOW
SL AMB POCT KETONES,UA: NORMAL
SL AMB POCT NITRITE,UA: NORMAL
SL AMB POCT PH,UA: 6
SL AMB POCT SPECIFIC GRAVITY,UA: 1.01
SL AMB POCT URINE PROTEIN: NORMAL
SL AMB POCT UROBILINOGEN: 0.2
SP GR UR STRIP.AUTO: 1.01 (ref 1–1.03)
UROBILINOGEN UR STRIP-ACNC: <2 MG/DL
WBC #/AREA URNS AUTO: ABNORMAL /HPF

## 2025-05-05 PROCEDURE — 51798 US URINE CAPACITY MEASURE: CPT | Performed by: PHYSICIAN ASSISTANT

## 2025-05-05 PROCEDURE — 81001 URINALYSIS AUTO W/SCOPE: CPT | Performed by: PHYSICIAN ASSISTANT

## 2025-05-05 PROCEDURE — 81002 URINALYSIS NONAUTO W/O SCOPE: CPT | Performed by: PHYSICIAN ASSISTANT

## 2025-05-05 PROCEDURE — 99214 OFFICE O/P EST MOD 30 MIN: CPT | Performed by: PHYSICIAN ASSISTANT

## 2025-05-05 RX ORDER — DIPHENOXYLATE HYDROCHLORIDE AND ATROPINE SULFATE 2.5; .025 MG/1; MG/1
1 TABLET ORAL DAILY
COMMUNITY

## 2025-05-05 NOTE — ASSESSMENT & PLAN NOTE
CT chest AP w contrast 4/14/25 - New 2.1 cm heterogeneously enhancing lesion in the left kidney. Duplicated right renal collecting system with atrophy and scarring of the lower pole moiety, similar to the prior study.  Urine culture at that time positive for E. Coli.   Patient was treated with antibiotics  UA today negative for infection. Positive for blood. Will send for micro   PVR today 17 mL  Discussed conservative measures with adequate hydration, avoidance of bladder irritants, avoidance of constipation  Recommend CT renal protocol for further evaluation of this area.   Cystoscopy   Declines estrace at this time  ER precautions   Orders:    POCT urine dip    POCT Measure PVR

## 2025-05-05 NOTE — TELEPHONE ENCOUNTER
"Patient called today to ask to be warm transferred to CS to reschedule CT Scan due to a conflict with scheduling. Pt stated that she attempted to contact CS directly but \"they had me on a loop\" and she was unable to connect to for assistance.     Pt was warm transferred to CS [place] for further assistance.   "

## 2025-05-06 ENCOUNTER — TELEPHONE (OUTPATIENT)
Dept: ENDOCRINOLOGY | Facility: CLINIC | Age: 60
End: 2025-05-06

## 2025-05-14 PROBLEM — N12 PYELONEPHRITIS OF LEFT KIDNEY: Status: RESOLVED | Noted: 2025-04-14 | Resolved: 2025-05-14

## 2025-05-16 PROBLEM — R65.20 SEVERE SEPSIS (HCC): Status: RESOLVED | Noted: 2025-04-14 | Resolved: 2025-05-16

## 2025-05-16 PROBLEM — A41.9 SEVERE SEPSIS (HCC): Status: RESOLVED | Noted: 2025-04-14 | Resolved: 2025-05-16

## 2025-05-24 ENCOUNTER — OFFICE VISIT (OUTPATIENT)
Age: 60
End: 2025-05-24
Payer: COMMERCIAL

## 2025-05-24 VITALS
OXYGEN SATURATION: 100 % | TEMPERATURE: 97.7 F | DIASTOLIC BLOOD PRESSURE: 64 MMHG | SYSTOLIC BLOOD PRESSURE: 102 MMHG | RESPIRATION RATE: 18 BRPM | HEART RATE: 65 BPM | WEIGHT: 156.2 LBS | BODY MASS INDEX: 29.51 KG/M2

## 2025-05-24 DIAGNOSIS — R35.0 FREQUENCY OF MICTURITION: Primary | ICD-10-CM

## 2025-05-24 DIAGNOSIS — N39.0 URINARY TRACT INFECTION WITHOUT HEMATURIA, SITE UNSPECIFIED: ICD-10-CM

## 2025-05-24 LAB
SL AMB  POCT GLUCOSE, UA: ABNORMAL
SL AMB LEUKOCYTE ESTERASE,UA: ABNORMAL
SL AMB POCT BILIRUBIN,UA: ABNORMAL
SL AMB POCT BLOOD,UA: ABNORMAL
SL AMB POCT CLARITY,UA: CLEAR
SL AMB POCT COLOR,UA: ABNORMAL
SL AMB POCT KETONES,UA: ABNORMAL
SL AMB POCT NITRITE,UA: ABNORMAL
SL AMB POCT PH,UA: 7
SL AMB POCT SPECIFIC GRAVITY,UA: 1
SL AMB POCT URINE PROTEIN: ABNORMAL
SL AMB POCT UROBILINOGEN: 0.2

## 2025-05-24 PROCEDURE — 99213 OFFICE O/P EST LOW 20 MIN: CPT | Performed by: PHYSICIAN ASSISTANT

## 2025-05-24 PROCEDURE — 87086 URINE CULTURE/COLONY COUNT: CPT | Performed by: PHYSICIAN ASSISTANT

## 2025-05-24 PROCEDURE — S9088 SERVICES PROVIDED IN URGENT: HCPCS | Performed by: PHYSICIAN ASSISTANT

## 2025-05-24 PROCEDURE — 87077 CULTURE AEROBIC IDENTIFY: CPT | Performed by: PHYSICIAN ASSISTANT

## 2025-05-24 RX ORDER — CIPROFLOXACIN 500 MG/1
500 TABLET, FILM COATED ORAL EVERY 12 HOURS SCHEDULED
Qty: 6 TABLET | Refills: 0 | Status: SHIPPED | OUTPATIENT
Start: 2025-05-24 | End: 2025-05-27

## 2025-05-24 NOTE — PROGRESS NOTES
[facility]        NAME: Adrienne Mendes is a 59 y.o. female  : 1965    MRN: [id number]  DATE: May 24, 2025  TIME: 8:57 AM      Assessment and Plan     Frequency of micturition [R35.0]  1. Frequency of micturition  POCT urine dip    Urine culture    Urine culture      2. Urinary tract infection without hematuria, site unspecified  ciprofloxacin (CIPRO) 500 mg tablet          Pt will hold daily multivitamin while on Cipro, states she has had this antibiotic before.    POC Testing Results        Note:       Patient Instructions     Patient Instructions     Please take and finish the course of antibiotics given unless otherwise directed   Your symptoms should improve within 24-48 hours of antibiotic use  A urine culture will be sent out -- if any changes to the treatment plan need to be made, our office will give you a call to let you know  Other measures to help with treatment   Drink plenty of fluids to flush out the urinary system   Use the restroom   Preventing UTI's   Females - wiping from front to back after using the restroom to prevent cross contamination of fecal bacteria into the urinary tract   Females - urinating within 5-10 minutes after sexual intercourse   Females and males - taking a cranberry supplement, probiotics, vitamin C, and/or D-mannose may help in certain cases   When to seek further medical attention   Symptoms continue beyond 24-48 hours of taking antibiotics   You develop fevers, chills, low back pain, or any other severe symptoms after starting antibiotics   You have any adverse reaction to the antibiotic prescribed        Follow up with primary care provider.   Go to ER if symptoms worsen.    Chief Complaint     Chief Complaint   Patient presents with    Possible UTI     Symptoms started a week ago. C/o smelly/cloudy urine.          History of Present Illness     Pt reports cloudy and smelly urine x 1 week. She denies dysuria, frequency, urgency, hematuria, fever,  abdominal pain or n/v/d. She reports history of sepsis due to pyelo.        Review of Systems     Review of Systems   Constitutional:  Negative for fever.   Gastrointestinal:  Negative for abdominal pain, diarrhea, nausea and vomiting.   Genitourinary:  Negative for dysuria, flank pain, frequency, hematuria and urgency.   Neurological:  Negative for syncope.         Current Medications     Current Medications[1]    Current Allergies     Allergies as of 05/24/2025 - Reviewed 05/24/2025   Allergen Reaction Noted    Sumatriptan Anaphylaxis 04/25/2025    Sulfur Rash 11/01/2022              The following portions of the patient's history were reviewed and updated as appropriate: allergies, current medications, past family history, past medical history, past social history, past surgical history, and problem list.     Past Medical History[2]    Past Surgical History[3]    Family History[4]      Medications have been verified.        Objective     /64   Pulse 65   Temp 97.7 °F (36.5 °C)   Resp 18   Wt 70.9 kg (156 lb 3.2 oz)   SpO2 100%   BMI 29.51 kg/m²   No LMP recorded. Patient has had a hysterectomy.         Physical Exam     Physical Exam  Constitutional:       General: She is not in acute distress.     Appearance: Normal appearance. She is not ill-appearing, toxic-appearing or diaphoretic.   HENT:      Head: Normocephalic and atraumatic.     Eyes:      General: No scleral icterus.        Right eye: No discharge.         Left eye: No discharge.      Extraocular Movements: Extraocular movements intact.      Conjunctiva/sclera: Conjunctivae normal.      Pupils: Pupils are equal, round, and reactive to light.       Cardiovascular:      Rate and Rhythm: Normal rate and regular rhythm.      Heart sounds: Normal heart sounds. No murmur heard.     No friction rub. No gallop.   Pulmonary:      Effort: Pulmonary effort is normal. No respiratory distress.      Breath sounds: Normal breath sounds. No stridor. No  wheezing, rhonchi or rales.   Abdominal:      General: Abdomen is flat. Bowel sounds are normal.      Palpations: Abdomen is soft. There is no shifting dullness, fluid wave, hepatomegaly, splenomegaly, mass or pulsatile mass.      Tenderness: There is no abdominal tenderness. There is no right CVA tenderness, left CVA tenderness, guarding or rebound.     Musculoskeletal:      Cervical back: Normal range of motion and neck supple. No rigidity.     Skin:     General: Skin is warm and dry.      Coloration: Skin is not jaundiced or pale.      Findings: No bruising, erythema, lesion or rash.     Neurological:      General: No focal deficit present.      Mental Status: She is alert and oriented to person, place, and time. Mental status is at baseline.     Psychiatric:         Mood and Affect: Mood normal.         Behavior: Behavior normal.         Thought Content: Thought content normal.         Judgment: Judgment normal.              [1]   Current Outpatient Medications:     ciprofloxacin (CIPRO) 500 mg tablet, Take 1 tablet (500 mg total) by mouth every 12 (twelve) hours for 3 days, Disp: 6 tablet, Rfl: 0    ibuprofen (MOTRIN) 600 mg tablet, Take one tablet every 6 hours with food for pain x 1 week if needed (Patient taking differently: Take one tablet every 6 hours with food for pain x 1 week if needed PRN), Disp: 21 tablet, Rfl: 0    multivitamin (THERAGRAN) TABS, Take 1 tablet by mouth daily, Disp: , Rfl:     PREBIOTIC PRODUCT PO, Take by mouth Pre and Pro, Disp: , Rfl:   [2] No past medical history on file.  [3]   Past Surgical History:  Procedure Laterality Date    PARTIAL HYSTERECTOMY      THYROIDECTOMY     [4]   Family History  Problem Relation Name Age of Onset    No Known Problems Mother      Liver cancer Father

## 2025-05-25 LAB — BACTERIA UR CULT: ABNORMAL

## 2025-05-26 LAB
BACTERIA UR CULT: ABNORMAL
BACTERIA UR CULT: ABNORMAL

## 2025-06-11 ENCOUNTER — TELEPHONE (OUTPATIENT)
Dept: NEPHROLOGY | Facility: CLINIC | Age: 60
End: 2025-06-11

## 2025-06-11 NOTE — TELEPHONE ENCOUNTER
I called and left a message on machine for patient to return our call about scheduling her 12 month nephrology follow up appointment with Dr. GLEN Claudio for on or after 7/22/2025. Patient is on the recall list.

## 2025-06-16 ENCOUNTER — TELEPHONE (OUTPATIENT)
Dept: NEPHROLOGY | Facility: CLINIC | Age: 60
End: 2025-06-16

## 2025-06-16 NOTE — TELEPHONE ENCOUNTER
2nd attempt to call patient left voice message for patient to call our nephrology office to schedule follow-up appointment with provider MD Jeffrey.    Letter sent via patient [employer].

## 2025-07-06 NOTE — PROGRESS NOTES
Name: Adrienne Mendes      : 1965      MRN: [id number]  Encounter Provider: Nam Cox MD  Encounter Date: 2025   Encounter department: [facility]  :  Assessment & Plan  Renal lesion  Area of hypodensity in left kidney, endophytic and posterior. 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 female who presents for evaluation of incidental left renal lesion  Films reviewed  Non smoker  No flank pain  Intermittent issues with stones in the past    The following portions of the patient's history were reviewed and updated as appropriate: allergies, current medications, past family history, past medical history, past social history, past surgical history and problem list.      Review of Systems   Constitutional: Negative.    HENT: Negative.     Eyes: Negative.    Respiratory: Negative.     Cardiovascular: Negative.    Gastrointestinal: Negative.    Endocrine: Negative.    Genitourinary: Negative.    Musculoskeletal: Negative.    Skin: Negative.    Allergic/Immunologic: Negative.    Neurological: Negative.    Hematological: Negative.    Psychiatric/Behavioral: Negative.            Objective   There were no vitals taken for this visit.    Physical Exam  Vitals reviewed.   Constitutional:       General: She is not in acute distress.     Appearance: Normal appearance. She is well-developed. She is not ill-appearing, toxic-appearing or diaphoretic.   HENT:      Head: Normocephalic and atraumatic.      Nose: Nose normal.     Eyes:      General: No scleral icterus.         Right eye: No discharge.         Left eye: No discharge.      Pupils: Pupils are equal, round, and reactive to light.     Neck:      Thyroid: No thyromegaly.      Trachea: No tracheal deviation.     Cardiovascular:      Rate and Rhythm: Normal rate and regular rhythm.   Pulmonary:      Effort: Pulmonary effort is normal. No respiratory distress.   Abdominal:      General: There is no distension.      Palpations: There is no mass.      Tenderness: There is no abdominal tenderness.     Musculoskeletal:         General: No deformity or signs of injury.      Cervical back: Normal range of motion and neck supple.   Lymphadenopathy:      Cervical: No cervical adenopathy.     Skin:     Coloration: Skin is not jaundiced or pale.      Findings: No erythema or rash.     Neurological:      Mental Status: She is alert and oriented to person, place, and time.      Cranial Nerves: No cranial nerve deficit.      Sensory: No sensory deficit.      Motor: No abnormal muscle tone.      Coordination: Coordination normal.     Psychiatric:         Mood and Affect: Mood normal.         Behavior: Behavior normal.         Thought Content: Thought content normal.         Judgment: Judgment normal.          Results     Lab Results   Component Value Date    CALCIUM 9.1 04/29/2025    K 4.1 04/29/2025    CO2 26 04/29/2025     04/29/2025    BUN 17 04/29/2025    CREATININE 0.69 04/29/2025     Lab Results   Component Value Date    WBC 4.31 04/29/2025    HGB 14.2 04/29/2025    HCT 43.0 04/29/2025    MCV 92 04/29/2025     04/29/2025       Office Urine Dip  No results found for this or any previous visit (from the past hour).  Radiology Results Review : I have reviewed images/report studies in PACS as described above (in the HPI).

## 2025-07-07 ENCOUNTER — OFFICE VISIT (OUTPATIENT)
Dept: UROLOGY | Facility: CLINIC | Age: 60
End: 2025-07-07
Payer: COMMERCIAL

## 2025-07-07 ENCOUNTER — TELEPHONE (OUTPATIENT)
Dept: UROLOGY | Facility: CLINIC | Age: 60
End: 2025-07-07

## 2025-07-07 VITALS
DIASTOLIC BLOOD PRESSURE: 62 MMHG | HEART RATE: 65 BPM | HEIGHT: 61 IN | OXYGEN SATURATION: 97 % | WEIGHT: 166 LBS | BODY MASS INDEX: 31.34 KG/M2 | SYSTOLIC BLOOD PRESSURE: 112 MMHG

## 2025-07-07 DIAGNOSIS — N28.9 RENAL LESION: Primary | ICD-10-CM

## 2025-07-07 PROCEDURE — 99204 OFFICE O/P NEW MOD 45 MIN: CPT | Performed by: UROLOGY

## 2025-07-07 NOTE — ASSESSMENT & PLAN NOTE
Area of hypodensity in left kidney, endophytic and posterior. 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

## 2025-07-07 NOTE — TELEPHONE ENCOUNTER
Feliz Buckley,    Good day,    Please kindly assist and switch appointment scheduled with Dr. Cox on 8/18/2025 @ 11:15 AM from NP to FU appointment. Confirmed with patient at check out.    Thank you in advance.        See Dr. Cox Notes:  Return in about 6 weeks (around 8/18/2025) for review CT scan Dr WELDON (order is in from Adrienne, please schedule).

## 2025-07-09 ENCOUNTER — TELEPHONE (OUTPATIENT)
Age: 60
End: 2025-07-09

## 2025-07-09 NOTE — TELEPHONE ENCOUNTER
Patient called to schedule a well visit with PCP.  Patient scheduled for Friday, 7/25/25 at 7 am.

## 2025-07-25 ENCOUNTER — OFFICE VISIT (OUTPATIENT)
Dept: FAMILY MEDICINE CLINIC | Facility: CLINIC | Age: 60
End: 2025-07-25
Payer: COMMERCIAL

## 2025-07-25 VITALS
DIASTOLIC BLOOD PRESSURE: 64 MMHG | WEIGHT: 162.4 LBS | HEIGHT: 61 IN | SYSTOLIC BLOOD PRESSURE: 110 MMHG | BODY MASS INDEX: 30.66 KG/M2 | RESPIRATION RATE: 18 BRPM | HEART RATE: 72 BPM | OXYGEN SATURATION: 98 %

## 2025-07-25 DIAGNOSIS — Z23 ENCOUNTER FOR IMMUNIZATION: ICD-10-CM

## 2025-07-25 DIAGNOSIS — Z00.00 ANNUAL PHYSICAL EXAM: Primary | ICD-10-CM

## 2025-07-25 DIAGNOSIS — Z11.1 SCREENING-PULMONARY TB: ICD-10-CM

## 2025-07-25 PROBLEM — E87.8 ELECTROLYTE ABNORMALITY: Status: RESOLVED | Noted: 2025-04-16 | Resolved: 2025-07-25

## 2025-07-25 PROCEDURE — 86580 TB INTRADERMAL TEST: CPT

## 2025-07-25 PROCEDURE — 99396 PREV VISIT EST AGE 40-64: CPT | Performed by: NURSE PRACTITIONER

## 2025-07-25 PROCEDURE — 90471 IMMUNIZATION ADMIN: CPT

## 2025-07-25 PROCEDURE — 90715 TDAP VACCINE 7 YRS/> IM: CPT

## 2025-07-27 ENCOUNTER — TELEPHONE (OUTPATIENT)
Dept: OTHER | Facility: OTHER | Age: 60
End: 2025-07-27

## 2025-08-04 ENCOUNTER — CLINICAL SUPPORT (OUTPATIENT)
Dept: FAMILY MEDICINE CLINIC | Facility: CLINIC | Age: 60
End: 2025-08-04
Payer: COMMERCIAL

## 2025-08-04 ENCOUNTER — APPOINTMENT (OUTPATIENT)
Age: 60
End: 2025-08-04
Attending: UROLOGY
Payer: COMMERCIAL

## 2025-08-04 DIAGNOSIS — Z23 ENCOUNTER FOR IMMUNIZATION: Primary | ICD-10-CM

## 2025-08-04 DIAGNOSIS — N28.9 RENAL LESION: ICD-10-CM

## 2025-08-04 LAB
ANION GAP SERPL CALCULATED.3IONS-SCNC: 7 MMOL/L (ref 4–13)
BUN SERPL-MCNC: 15 MG/DL (ref 5–25)
CALCIUM SERPL-MCNC: 9.3 MG/DL (ref 8.4–10.2)
CHLORIDE SERPL-SCNC: 104 MMOL/L (ref 96–108)
CO2 SERPL-SCNC: 29 MMOL/L (ref 21–32)
CREAT SERPL-MCNC: 0.75 MG/DL (ref 0.6–1.3)
GFR SERPL CREATININE-BSD FRML MDRD: 87 ML/MIN/1.73SQ M
GLUCOSE P FAST SERPL-MCNC: 100 MG/DL (ref 65–99)
POTASSIUM SERPL-SCNC: 4.4 MMOL/L (ref 3.5–5.3)
SODIUM SERPL-SCNC: 140 MMOL/L (ref 135–147)

## 2025-08-04 PROCEDURE — 36415 COLL VENOUS BLD VENIPUNCTURE: CPT

## 2025-08-04 PROCEDURE — 86580 TB INTRADERMAL TEST: CPT

## 2025-08-04 PROCEDURE — 80048 BASIC METABOLIC PNL TOTAL CA: CPT

## 2025-08-06 ENCOUNTER — CLINICAL SUPPORT (OUTPATIENT)
Dept: FAMILY MEDICINE CLINIC | Facility: CLINIC | Age: 60
End: 2025-08-06

## 2025-08-06 DIAGNOSIS — Z23 ENCOUNTER FOR IMMUNIZATION: Primary | ICD-10-CM

## 2025-08-06 LAB
INDURATION: 0 MM
TB SKIN TEST: NEGATIVE

## 2025-08-06 PROCEDURE — NURSE

## 2025-08-11 ENCOUNTER — HOSPITAL ENCOUNTER (OUTPATIENT)
Dept: CT IMAGING | Facility: CLINIC | Age: 60
Discharge: HOME | End: 2025-08-11
Attending: PHYSICIAN ASSISTANT
Payer: COMMERCIAL

## 2025-08-13 ENCOUNTER — PATIENT MESSAGE (OUTPATIENT)
Dept: UROLOGY | Facility: CLINIC | Age: 60
End: 2025-08-13

## 2025-08-18 ENCOUNTER — OFFICE VISIT (OUTPATIENT)
Dept: UROLOGY | Facility: CLINIC | Age: 60
End: 2025-08-18
Payer: COMMERCIAL

## 2025-08-18 VITALS
HEIGHT: 61 IN | DIASTOLIC BLOOD PRESSURE: 64 MMHG | SYSTOLIC BLOOD PRESSURE: 118 MMHG | HEART RATE: 71 BPM | OXYGEN SATURATION: 97 % | WEIGHT: 164 LBS | BODY MASS INDEX: 30.96 KG/M2

## 2025-08-18 DIAGNOSIS — N20.0 NEPHROLITHIASIS: Primary | ICD-10-CM

## 2025-08-18 DIAGNOSIS — N28.9 RENAL LESION: ICD-10-CM

## 2025-08-18 PROCEDURE — 99213 OFFICE O/P EST LOW 20 MIN: CPT | Performed by: UROLOGY
